# Patient Record
Sex: FEMALE | Race: OTHER | HISPANIC OR LATINO
[De-identification: names, ages, dates, MRNs, and addresses within clinical notes are randomized per-mention and may not be internally consistent; named-entity substitution may affect disease eponyms.]

---

## 2020-10-12 ENCOUNTER — APPOINTMENT (OUTPATIENT)
Dept: GASTROENTEROLOGY | Facility: CLINIC | Age: 55
End: 2020-10-12

## 2023-05-25 ENCOUNTER — INPATIENT (INPATIENT)
Facility: HOSPITAL | Age: 58
LOS: 11 days | Discharge: HOME | End: 2023-06-06
Attending: PSYCHIATRY & NEUROLOGY | Admitting: PSYCHIATRY & NEUROLOGY
Payer: MEDICAID

## 2023-05-25 VITALS
TEMPERATURE: 99 F | OXYGEN SATURATION: 100 % | SYSTOLIC BLOOD PRESSURE: 134 MMHG | DIASTOLIC BLOOD PRESSURE: 82 MMHG | RESPIRATION RATE: 16 BRPM | HEART RATE: 85 BPM

## 2023-05-25 DIAGNOSIS — F32.3 MAJOR DEPRESSIVE DISORDER, SINGLE EPISODE, SEVERE WITH PSYCHOTIC FEATURES: ICD-10-CM

## 2023-05-25 LAB
ALBUMIN SERPL ELPH-MCNC: 4.2 G/DL — SIGNIFICANT CHANGE UP (ref 3.3–5)
ALP SERPL-CCNC: 75 U/L — SIGNIFICANT CHANGE UP (ref 40–120)
ALT FLD-CCNC: 17 U/L — SIGNIFICANT CHANGE UP (ref 4–33)
ANION GAP SERPL CALC-SCNC: 10 MMOL/L — SIGNIFICANT CHANGE UP (ref 7–14)
APPEARANCE UR: CLEAR — SIGNIFICANT CHANGE UP
AST SERPL-CCNC: 16 U/L — SIGNIFICANT CHANGE UP (ref 4–32)
BASOPHILS # BLD AUTO: 0.07 K/UL — SIGNIFICANT CHANGE UP (ref 0–0.2)
BASOPHILS NFR BLD AUTO: 0.9 % — SIGNIFICANT CHANGE UP (ref 0–2)
BILIRUB SERPL-MCNC: 0.3 MG/DL — SIGNIFICANT CHANGE UP (ref 0.2–1.2)
BILIRUB UR-MCNC: NEGATIVE — SIGNIFICANT CHANGE UP
BUN SERPL-MCNC: 6 MG/DL — LOW (ref 7–23)
CALCIUM SERPL-MCNC: 8.9 MG/DL — SIGNIFICANT CHANGE UP (ref 8.4–10.5)
CHLORIDE SERPL-SCNC: 105 MMOL/L — SIGNIFICANT CHANGE UP (ref 98–107)
CO2 SERPL-SCNC: 26 MMOL/L — SIGNIFICANT CHANGE UP (ref 22–31)
COLOR SPEC: SIGNIFICANT CHANGE UP
CREAT SERPL-MCNC: 0.7 MG/DL — SIGNIFICANT CHANGE UP (ref 0.5–1.3)
DIFF PNL FLD: NEGATIVE — SIGNIFICANT CHANGE UP
EGFR: 100 ML/MIN/1.73M2 — SIGNIFICANT CHANGE UP
EOSINOPHIL # BLD AUTO: 0.05 K/UL — SIGNIFICANT CHANGE UP (ref 0–0.5)
EOSINOPHIL NFR BLD AUTO: 0.7 % — SIGNIFICANT CHANGE UP (ref 0–6)
GLUCOSE SERPL-MCNC: 121 MG/DL — HIGH (ref 70–99)
GLUCOSE UR QL: NEGATIVE — SIGNIFICANT CHANGE UP
HCT VFR BLD CALC: 38.8 % — SIGNIFICANT CHANGE UP (ref 34.5–45)
HGB BLD-MCNC: 12.4 G/DL — SIGNIFICANT CHANGE UP (ref 11.5–15.5)
IANC: 3.69 K/UL — SIGNIFICANT CHANGE UP (ref 1.8–7.4)
IMM GRANULOCYTES NFR BLD AUTO: 0.1 % — SIGNIFICANT CHANGE UP (ref 0–0.9)
KETONES UR-MCNC: NEGATIVE — SIGNIFICANT CHANGE UP
LEUKOCYTE ESTERASE UR-ACNC: ABNORMAL
LYMPHOCYTES # BLD AUTO: 2.92 K/UL — SIGNIFICANT CHANGE UP (ref 1–3.3)
LYMPHOCYTES # BLD AUTO: 38 % — SIGNIFICANT CHANGE UP (ref 13–44)
MCHC RBC-ENTMCNC: 27.7 PG — SIGNIFICANT CHANGE UP (ref 27–34)
MCHC RBC-ENTMCNC: 32 GM/DL — SIGNIFICANT CHANGE UP (ref 32–36)
MCV RBC AUTO: 86.8 FL — SIGNIFICANT CHANGE UP (ref 80–100)
MONOCYTES # BLD AUTO: 0.95 K/UL — HIGH (ref 0–0.9)
MONOCYTES NFR BLD AUTO: 12.4 % — SIGNIFICANT CHANGE UP (ref 2–14)
NEUTROPHILS # BLD AUTO: 3.69 K/UL — SIGNIFICANT CHANGE UP (ref 1.8–7.4)
NEUTROPHILS NFR BLD AUTO: 47.9 % — SIGNIFICANT CHANGE UP (ref 43–77)
NITRITE UR-MCNC: NEGATIVE — SIGNIFICANT CHANGE UP
NRBC # BLD: 0 /100 WBCS — SIGNIFICANT CHANGE UP (ref 0–0)
NRBC # FLD: 0 K/UL — SIGNIFICANT CHANGE UP (ref 0–0)
PCP SPEC-MCNC: SIGNIFICANT CHANGE UP
PH UR: 6.5 — SIGNIFICANT CHANGE UP (ref 5–8)
PLATELET # BLD AUTO: 313 K/UL — SIGNIFICANT CHANGE UP (ref 150–400)
POTASSIUM SERPL-MCNC: 4.4 MMOL/L — SIGNIFICANT CHANGE UP (ref 3.5–5.3)
POTASSIUM SERPL-SCNC: 4.4 MMOL/L — SIGNIFICANT CHANGE UP (ref 3.5–5.3)
PROT SERPL-MCNC: 7 G/DL — SIGNIFICANT CHANGE UP (ref 6–8.3)
PROT UR-MCNC: NEGATIVE — SIGNIFICANT CHANGE UP
RBC # BLD: 4.47 M/UL — SIGNIFICANT CHANGE UP (ref 3.8–5.2)
RBC # FLD: 12.5 % — SIGNIFICANT CHANGE UP (ref 10.3–14.5)
SODIUM SERPL-SCNC: 141 MMOL/L — SIGNIFICANT CHANGE UP (ref 135–145)
SP GR SPEC: 1.01 — SIGNIFICANT CHANGE UP (ref 1.01–1.05)
TOXICOLOGY SCREEN, DRUGS OF ABUSE, SERUM RESULT: SIGNIFICANT CHANGE UP
TSH SERPL-MCNC: 1.86 UIU/ML — SIGNIFICANT CHANGE UP (ref 0.27–4.2)
UROBILINOGEN FLD QL: SIGNIFICANT CHANGE UP
WBC # BLD: 7.69 K/UL — SIGNIFICANT CHANGE UP (ref 3.8–10.5)
WBC # FLD AUTO: 7.69 K/UL — SIGNIFICANT CHANGE UP (ref 3.8–10.5)

## 2023-05-25 PROCEDURE — 99285 EMERGENCY DEPT VISIT HI MDM: CPT

## 2023-05-25 RX ORDER — HYDROXYZINE HCL 10 MG
25 TABLET ORAL EVERY 4 HOURS
Refills: 0 | Status: DISCONTINUED | OUTPATIENT
Start: 2023-05-26 | End: 2023-06-06

## 2023-05-25 NOTE — ED BEHAVIORAL HEALTH ASSESSMENT NOTE - SUMMARY
57 y/o single,  female, has 2 adult sons, domicile , with no significant medical hx, no formal psychiatric hx, has remote hx of sa via OD  at age 14 , stayed in the hospital one day, no other treatment and currently not in treatment. Patient denies any hx of NSSIB. she denies any hx of substance use, aggression or violence . Pt bib son for depression and si. 57 y/o single,  female, has 2 adult sons, domicile , with no significant medical hx, no formal psychiatric hx, has remote hx of sa via OD  at age 14 , stayed in the hospital one day, no other treatment and currently not in treatment. Patient denies any hx of NSSIB. she denies any hx of substance use, aggression or violence . Pt bib son for depression and si.    Patient presents with symptoms concerning for depressive episode with passive  +si , although currently denies any active si/i/p. Patient had active si and s/p sa by OD and cutting her wrist . She is ambivalent about the sa , not future oriented , remains hopeless , and not able to engage in safety planning . Patient is expressing some desire to get better and agreeable to voluntary admission for stabilization and safety.   Patient doesn't need 1:1 denies any active si/i/p and is able to contract for safety in the hospital setting.

## 2023-05-25 NOTE — ED ADULT NURSE NOTE - OBJECTIVE STATEMENT
Pt received to ; presents calm and cooperative. Pt denies psychiatric hx; states she has been feeling depressed, anxious and suicidal because "things in life haven't been going her way and everything is crumbling". Pt states yesterday she cut her wrist (small, superficial abrasion noted to pts L wrist). Pt denies HI. Pts belongings secured for safety. Pt awaiting psychiatric evaluation.

## 2023-05-25 NOTE — ED PROVIDER NOTE - CLINICAL SUMMARY MEDICAL DECISION MAKING FREE TEXT BOX
This is a 58-year-old female with no significant past medical history presented to the ED complaining having suicidal attempt and ideations over the last several day. Suicidal Attempt/ideations-labs, ekg, psych consultation reassess

## 2023-05-25 NOTE — ED BEHAVIORAL HEALTH ASSESSMENT NOTE - NSPRESENTSXS_PSY_ALL_CORE
Addended by: STAR MOLINA on: 2/13/2017 01:56 PM     Modules accepted: Orders    
Depressed mood/Anhedonia/Impulsivity/Hopelessness or despair/Severe anxiety, agitation or panic

## 2023-05-25 NOTE — ED BEHAVIORAL HEALTH ASSESSMENT NOTE - RISK ASSESSMENT
pt is at high acute risk for self harm, given sa via od and cutting writs, remains suicidal , hopeless with active depressive sx. Pt has other risk factors: prior sa, not in treatment.  Protective factors: has insight , supportive family , no substance use , wants to get better.   Risks will be mitigated by inpatient hospitalization.

## 2023-05-25 NOTE — ED BEHAVIORAL HEALTH NOTE - BEHAVIORAL HEALTH NOTE
COVID Exposure Screen- Patient    Have you been tested for COVID-19 in the last 90 days?  (  ) Yes  ( x ) No   (  ) Unknown- Reason: _____  IF YES: Date of test(s), type of test(s), result(s) for ALL tests in last 90 days: ________    In the past 10 days, have you been around anyone with a positive COVID-19 test? (  ) Yes  (x  ) No   (  ) Unknown- Reason: ____  IF YES: Were you closer than 6 feet of them for a total of 15 minutes or more in a 24 hour period? (  ) Yes   (  ) No   ( ) Unknown- Reason: _____

## 2023-05-25 NOTE — ED BEHAVIORAL HEALTH ASSESSMENT NOTE - PSYCHIATRIC ISSUES AND PLAN (INCLUDE STANDING AND PRN MEDICATION)
defer standing medications to primary team . for agitation: Ativan 2mg po/im q6hrs prn . for anxiety: atarax : 25 mg q4hrs prn

## 2023-05-25 NOTE — ED BEHAVIORAL HEALTH ASSESSMENT NOTE - HPI (INCLUDE ILLNESS QUALITY, SEVERITY, DURATION, TIMING, CONTEXT, MODIFYING FACTORS, ASSOCIATED SIGNS AND SYMPTOMS)
59 y/o single,  female, has 2 adult sons, domicile , with no significant medical hx, no formal psychiatric hx, has remote hx of sa via OD  at age 14 , stayed in the hospital one day, no other treatment and currently not in treatment. Patient denies any hx of NSSIB. she denies any hx of substance use, aggression or violence . Pt bib son for depression and si.     On assessment pt is calm, cooperative . She reports in the past week she has been feeling "like my life is being played out in front of me ", "everything seems to be off ". Patient explains she feels like a negative force  is pulling her, and has been feeling overwhelmed by the feelings . Last night she felt anxious and is endorsing having a panic attack, "the walls were caving in ", and she felt sob , and in the moment felt she wanted to die , the feeling prompting her to drink 1/2 bottle of Nyquil in sa . When she saw that Nyquil  did not effect her she took a knife and tried to cut her left  wrists with intention to kill herself . (pt has small superficial cuts on the left wrist , no sutures required). Patient reports stopping herself from cutting deeper because she felt tired and sleepy and went to bed. This morning she felt "mixed emotions" and then states "like I am getting closer to the end of my own existence ". She denies having si prior to last night .  She told her friends today  about her sa and they informed her son who brought pt to the hospital. Pt is unable to identify any specific triggers for this episode .   Pt reports depressive sx x1 week , including decrease energy level, poor appetite  , lost 10 lbs in the past few weeks , poor sleep explaining that the sleep is restless and gets about 4-5 hrs/night, +anhedonia, loss of interest in doing things including reading books and watching tv, she is endorsing hopelessness and passive si , but denies any active si/i/p. Patient denies any hi/i/p. She admits to having suspiciousness and paranoia that she is being watched . She denies ah/vh . No manic sx including elevated mood, decrease need for sleep, grandiosity.

## 2023-05-25 NOTE — ED ADULT TRIAGE NOTE - CHIEF COMPLAINT QUOTE
Pt c/o SI x 3 days. pt states she has been having inc stressors at home and snapped. pt tried to cut her wrists yesterday. Pt noted to have superficial lacerations to left wrist. Pt states everything is moving so fast and doesn't make sense to her. Pt denies HI.  Pt denies visual or auditory hallucinations or other complaints. Pt states he has one psych admission with never had a diagnosis.

## 2023-05-25 NOTE — ED ADULT NURSE NOTE - NSFALLUNIVINTERV_ED_ALL_ED
Non-slip footwear applied when patient is off stretcher/Physically safe environment - no spills, clutter or unnecessary equipment/Purposeful proactive rounding

## 2023-05-25 NOTE — ED PROVIDER NOTE - PROGRESS NOTE DETAILS
MD CHO:  I received s/o on this pt from PETAR Milan.  Plan:  f/u covid and admit pt to [facility].  Will admit to 2w, Dr. Null.

## 2023-05-25 NOTE — ED BEHAVIORAL HEALTH NOTE - BEHAVIORAL HEALTH NOTE
As per request of provider, writer contacted patient’s son Glen ( [phone]) to obtain collateral information. The following information is per son.    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 Glen reports patient had friends over today due to concerns and patient shared that she harmed herself yesterday with a knife. son reports patient has presented bizarre since Monday. Patient was scheduled to go to [facility] on Monday but cancelled due ot knots in her stomach. Son says the patient mentioned her life insurance to the son this week which was odd. Patient reports feeling in a fog and is stressed. He reports the patient presents hopeless and has made statements today such as “ I don’t see my purpose here, I don’t want to be here anymore, nobody is helping me and everyone is against me”. Son says patient has no hx of si and no prior suicide attempts. He reports the patient is not paranoid or delusional. He says the patient denies any Avh. 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 He feels this week she has been more sheltered. Patient has no medical problems. Patient is covid vaccinated and has no recent travel or exposure. He denied any family hx of mental illness or addiction. Son reports patient does not have access to firearms or weapons. He reports patient’s father has a hx of dementia. He says patient does not have access to fire arms or weapons. Son unsure what patient currently needs at this time. As per request of provider, writer contacted patient’s son Glen ( [phone]) to obtain collateral information. The following information is per son.    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 Glen reports patient had friends over today due to concerns and patient shared that she harmed herself yesterday with a knife. son reports patient has presented bizarre since Monday. Patient was scheduled to go to [facility] on Monday but cancelled due ot knots in her stomach. Son says the patient mentioned her life insurance to the son this week which was odd. Patient reports feeling in a fog and is stressed. He reports the patient presents hopeless and has made statements today such as “ I don’t see my purpose here, I don’t want to be here anymore, nobody is helping me and everyone is against me”. Son says patient has no hx of si and no prior suicide attempts. He reports the patient is not paranoid or delusional. He says the patient denies any Avh. 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 He feels this week she has been more sheltered. Patient has no medical problems. Patient is covid vaccinated and has no recent travel or exposure. He denied any family hx of mental illness or addiction. Son reports patient does not have access to firearms or weapons. He reports patient’s father has a hx of dementia. He says patient does not have access to fire arms or weapons. Son unsure what patient currently needs at this time.    writer contacted patient’s son Glen ( [phone]) to inform him of patient's admission to [facility].

## 2023-05-25 NOTE — ED PROVIDER NOTE - OBJECTIVE STATEMENT
This is a 58-year-old female with no significant past medical history presented to the ED complaining having suicidal attempt and ideations over the last several days.  She states that a lot of things in her life are not going her way and have been feeling depressed and anxious.  She attempted to cut herself on her left wrist with a knife and she also attempted to drink Benadryl half a bottle to commit suicide.  Family and son were concerned about her this forcing her to come to the emergency room to get the help.  She states that she had a trip planned to [place] however had to cancel due to the stressors in her life.  She denies having any dysuria hematuria urinary urgency frequency drug or alcohol use.  She said she would hospitalized once before for psychiatric reason however is not ever been formally diagnosed with depression or anxiety or any psychiatric problems she denies having any homicidal ideations and drug or alcohol use or auditory or visual or auditory visual hallucinations

## 2023-05-25 NOTE — ED BEHAVIORAL HEALTH ASSESSMENT NOTE - NSSUICRSKFACTOR_PSY_ALL_CORE
Patient states that levothyroxine and synthroid does not work but she will try the [place] Thyroid 120 mg again. She has 15 pills left at home that she will restart this. Patient aware of the rest of the lab work was normal.   Sai Pierce is not going to be in the office until June 1 and that will not help her with her always being cold and her all over pain.
Current and Past Psychiatric Diagnoses/Presenting Symptoms/Historical Factors/Treatment Related Factors

## 2023-05-25 NOTE — ED BEHAVIORAL HEALTH ASSESSMENT NOTE - DESCRIPTION
none known single , has 2 adult sons, empolyed calm, cooperative  Vital Signs Last 24 Hrs  T(C): 37 (25 May 2023 20:55), Max: 37 (25 May 2023 20:55)  T(F): 98.6 (25 May 2023 20:55), Max: 98.6 (25 May 2023 20:55)  HR: 85 (25 May 2023 20:55) (85 - 85)  BP: 134/82 (25 May 2023 20:55) (134/82 - 134/82)  BP(mean): --  RR: 16 (25 May 2023 20:55) (16 - 16)  SpO2: 100% (25 May 2023 20:55) (100% - 100%)    Parameters below as of 25 May 2023 20:55  Patient On (Oxygen Delivery Method): room air single , has 2 adult sons, employed

## 2023-05-26 DIAGNOSIS — R45.851 SUICIDAL IDEATIONS: ICD-10-CM

## 2023-05-26 LAB
FLUAV AG NPH QL: SIGNIFICANT CHANGE UP
FLUBV AG NPH QL: SIGNIFICANT CHANGE UP
RSV RNA NPH QL NAA+NON-PROBE: SIGNIFICANT CHANGE UP
SARS-COV-2 RNA SPEC QL NAA+PROBE: SIGNIFICANT CHANGE UP

## 2023-05-26 PROCEDURE — 99222 1ST HOSP IP/OBS MODERATE 55: CPT

## 2023-05-26 RX ORDER — SERTRALINE 25 MG/1
25 TABLET, FILM COATED ORAL DAILY
Refills: 0 | Status: DISCONTINUED | OUTPATIENT
Start: 2023-05-27 | End: 2023-05-29

## 2023-05-26 RX ORDER — LANOLIN ALCOHOL/MO/W.PET/CERES
3 CREAM (GRAM) TOPICAL AT BEDTIME
Refills: 0 | Status: DISCONTINUED | OUTPATIENT
Start: 2023-05-26 | End: 2023-06-06

## 2023-05-26 RX ORDER — MAGNESIUM HYDROXIDE 400 MG/1
30 TABLET, CHEWABLE ORAL DAILY
Refills: 0 | Status: DISCONTINUED | OUTPATIENT
Start: 2023-05-26 | End: 2023-06-06

## 2023-05-26 RX ORDER — ACETAMINOPHEN 500 MG
650 TABLET ORAL EVERY 6 HOURS
Refills: 0 | Status: DISCONTINUED | OUTPATIENT
Start: 2023-05-26 | End: 2023-06-06

## 2023-05-26 RX ORDER — HALOPERIDOL DECANOATE 100 MG/ML
5 INJECTION INTRAMUSCULAR EVERY 6 HOURS
Refills: 0 | Status: DISCONTINUED | OUTPATIENT
Start: 2023-05-26 | End: 2023-06-06

## 2023-05-26 RX ORDER — HALOPERIDOL DECANOATE 100 MG/ML
5 INJECTION INTRAMUSCULAR ONCE
Refills: 0 | Status: DISCONTINUED | OUTPATIENT
Start: 2023-05-26 | End: 2023-06-06

## 2023-05-26 RX ORDER — OLANZAPINE 15 MG/1
2.5 TABLET, FILM COATED ORAL AT BEDTIME
Refills: 0 | Status: DISCONTINUED | OUTPATIENT
Start: 2023-05-26 | End: 2023-05-31

## 2023-05-26 RX ORDER — SERTRALINE 25 MG/1
25 TABLET, FILM COATED ORAL ONCE
Refills: 0 | Status: COMPLETED | OUTPATIENT
Start: 2023-05-26 | End: 2023-05-26

## 2023-05-26 RX ADMIN — OLANZAPINE 2.5 MILLIGRAM(S): 15 TABLET, FILM COATED ORAL at 22:01

## 2023-05-26 RX ADMIN — SERTRALINE 25 MILLIGRAM(S): 25 TABLET, FILM COATED ORAL at 18:13

## 2023-05-26 NOTE — BH SOCIAL WORK INITIAL PSYCHOSOCIAL EVALUATION - NSCMSPTSTRENGTHS_PSY_ALL_CORE
Expressive of emotions/Financial stability/Intact employment/Intact family/Interpersonal skills/Supportive family

## 2023-05-26 NOTE — BH INPATIENT PSYCHIATRY ASSESSMENT NOTE - CURRENT MEDICATION
MEDICATIONS  (STANDING):  OLANZapine 2.5 milliGRAM(s) Oral at bedtime  sertraline 25 milliGRAM(s) Oral once    MEDICATIONS  (PRN):  acetaminophen     Tablet .. 650 milliGRAM(s) Oral every 6 hours PRN Mild Pain (1 - 3), Moderate Pain (4 - 6), Severe Pain (7 - 10)  haloperidol     Tablet 5 milliGRAM(s) Oral every 6 hours PRN agitation  haloperidol    Injectable 5 milliGRAM(s) IntraMuscular once PRN severe agitation  LORazepam     Tablet 1 milliGRAM(s) Oral every 6 hours PRN anxiety  LORazepam   Injectable 2 milliGRAM(s) IntraMuscular once PRN severe anxiety  magnesium hydroxide Suspension 30 milliLiter(s) Oral daily PRN Constipation  melatonin. 3 milliGRAM(s) Oral at bedtime PRN Insomnia   MEDICATIONS  (STANDING):  OLANZapine 2.5 milliGRAM(s) Oral at bedtime    MEDICATIONS  (PRN):  acetaminophen     Tablet .. 650 milliGRAM(s) Oral every 6 hours PRN Mild Pain (1 - 3), Moderate Pain (4 - 6), Severe Pain (7 - 10)  haloperidol     Tablet 5 milliGRAM(s) Oral every 6 hours PRN agitation  haloperidol    Injectable 5 milliGRAM(s) IntraMuscular once PRN severe agitation  hydrOXYzine hydrochloride 25 milliGRAM(s) Oral every 4 hours PRN Anxiety  LORazepam     Tablet 1 milliGRAM(s) Oral every 6 hours PRN anxiety  LORazepam   Injectable 2 milliGRAM(s) IntraMuscular once PRN severe anxiety  magnesium hydroxide Suspension 30 milliLiter(s) Oral daily PRN Constipation  melatonin. 3 milliGRAM(s) Oral at bedtime PRN Insomnia

## 2023-05-26 NOTE — BH INPATIENT PSYCHIATRY ASSESSMENT NOTE - HPI (INCLUDE ILLNESS QUALITY, SEVERITY, DURATION, TIMING, CONTEXT, MODIFYING FACTORS, ASSOCIATED SIGNS AND SYMPTOMS)
59 y/o single,  female, has 2 adult sons, domicile , with no significant medical hx, no formal psychiatric hx, has remote hx of sa via OD  at age 14 , stayed in the hospital one day, no other treatment and currently not in treatment. Patient denies any hx of NSSIB. she denies any hx of substance use, aggression or violence . Pt bib son for depression and si.     On 2W: pt reports that she has been struggling with "anxiety, depression, and confusion" for the past two weeks. 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 Pt states her depression focuses around "poor concentration, memory, and motivation." Pt reports trouble with sleep because of this and often feels unrested when she wakes. Appetite has also been negatively impacted and she has been eating less. Other depressive symptoms include anhedonia and lack of interest. She reports that she feels the world has gone "topsy turvey and upside down" and that she has feelings of "people controlling my mind." She denies AVH. She is unable to attribute worsening depression and anxiety to anything in particular and states that she "snapped," leading her to take nyquil and cut self. L wrist assessed with noted superficial cuts, now scabbed. Pt denies pain at cite at this time. Pt denies other cuts on body with none noted. Pt reports that she does not have a history of SI/SA and that this is her first attempt, not elaborating on prior OD when asked. At this time, pt denies SI/HI.     Per ED: On assessment pt is calm, cooperative . She reports in the past week she has been feeling "like my life is being played out in front of me ", "everything seems to be off ". Patient explains she feels like a negative force  is pulling her, and has been feeling overwhelmed by the feelings . Last night she felt anxious and is endorsing having a panic attack, "the walls were caving in ", and she felt sob , and in the moment felt she wanted to die , the feeling prompting her to drink 1/2 bottle of Nyquil in sa . When she saw that Nyquil  did not effect her she took a knife and tried to cut her left  wrists with intention to kill herself . (pt has small superficial cuts on the left wrist , no sutures required). Patient reports stopping herself from cutting deeper because she felt tired and sleepy and went to bed. This morning she felt "mixed emotions" and then states "like I am getting closer to the end of my own existence ". She denies having si prior to last night .  She told her friends today  about her sa and they informed her son who brought pt to the hospital. Pt is unable to identify any specific triggers for this episode .   Pt reports depressive sx x1 week , including decrease energy level, poor appetite  , lost 10 lbs in the past few weeks , poor sleep explaining that the sleep is restless and gets about 4-5 hrs/night, +anhedonia, loss of interest in doing things including reading books and watching tv, she is endorsing hopelessness and passive si , but denies any active si/i/p. Patient denies any hi/i/p. She admits to having suspiciousness and paranoia that she is being watched . She denies ah/vh . No manic sx including elevated mood, decrease need for sleep, grandiosity.

## 2023-05-26 NOTE — BH INPATIENT PSYCHIATRY ASSESSMENT NOTE - NSCOMMENTSUICRISKFACT_PSY_ALL_CORE
pt currently denies SI/HI/AVH though is at an increase risk due to psychotic features, lack of psychiatric care, prior history of SA, history of trauma. Risk factors offset by support from her  and current inpatient level of care and willingness to take medication

## 2023-05-26 NOTE — BH INPATIENT PSYCHIATRY ASSESSMENT NOTE - NSBHASSESSSUMMFT_PSY_ALL_CORE
59 y/o single,  female, has 2 adult sons, domicile , with no significant medical hx, no formal psychiatric hx, has remote hx of sa via OD  at age 14 , stayed in the hospital one day, no other treatment and currently not in treatment. Patient denies any hx of NSSIB. she denies any hx of substance use, aggression or violence . Pt bib son for depression and si.     on assessment, pt presented guarded, suspicious at times, superficially cooperative with interview. Pt offered minimal information as to presenting problems, showing poor insight into need for hospitalization with request for discharge. Treatment plan reviewed with pt, discussing medications along with common side effects, with pt stating "I'll think about it." Sertraline started to help target depressive symptoms and anxiety. Olanzapine started to target psychosis and help improve sleep. No behavioral issues noted. Continue to monitor for safety.     Treatment Plan  1. admitted to unit, legal status 9.13  2. safety  - routine checks as pt denies SIIP/HIIP and is able to contract for safety  - haldol and ativan PO/IM PRN for agitation and anxiety  3. psychiatric  - started sertraline 25mg for MDD  - started olanzapine 2.5mg for MDD with psychotic features  4. medical  - pt denies  5. encourage I/G/M therapy

## 2023-05-26 NOTE — BH SOCIAL WORK INITIAL PSYCHOSOCIAL EVALUATION - OTHER PAST PSYCHIATRIC HISTORY (INCLUDE DETAILS REGARDING ONSET, COURSE OF ILLNESS, INPATIENT/OUTPATIENT TREATMENT)
Pt is a 57 y/o single,  female, has 2 adult sons, domiciled , with no significant medical hx, no formal psychiatric hx, has remote hx of sa via OD  at age 14 , stayed in the hospital one day, no other treatment and currently not in treatment. Patient denies any hx of NSSIB. she denies any hx of substance use, aggression or violence . Pt bib son for depression and si.   In ED, pt reported that in the past week she has been feeling "like my life is being played out in front of me ", "everything seems to be off ". Patient explains she feels like a negative force  is pulling her, and has been feeling overwhelmed by the feelings . Last night she felt anxious and is endorsing having a panic attack, "the walls were caving in ", and she felt SOB, and in the moment felt she wanted to die. This feeling prompted her to drink 1/2 bottle of Nyquil in a SA . When she saw that Nyquil  did not effect her she took a knife and tried to cut her left  wrists with intention to kill herself . (pt has small superficial cuts on the left wrist , no sutures required). Patient reports stopping herself from cutting deeper because she felt tired and sleepy and went to bed. This morning she felt "mixed emotions" and then states "like I am getting closer to the end of my own existence ". She denies having SI prior to that episode.  Pt told her friends about her SA when they were at her house, and they informed her son who brought pt to the hospital. Pt is unable to identify any specific triggers for this episode .   Pt reports depressive sx x1 week , including decrease energy level, poor appetite  , lost 10 lbs in the past few weeks , poor sleep explaining that the sleep is restless and gets about 4-5 hrs/night, +anhedonia, loss of interest in doing things including reading books and watching tv, she is endorsing hopelessness and passive si , but denies any active si/i/p. Patient denies any hi/i/p. She admits to having suspiciousness and paranoia that she is being watched . She denies ah/vh . No manic sx including elevated mood, decrease need for sleep, grandiosity.    The following information is per son, Glen:  Glen reports patient had friends over and patient shared that she harmed herself yesterday with a knife. Son reports patient has presented bizarre since Monday. Patient was scheduled to go to [facility] on Monday but cancelled due to knots in her stomach. Son says the patient mentioned her life insurance to the son this week which was odd. Patient reports feeling in a fog and is stressed. He reports the patient presents hopeless and has made statements today such as “ I don’t see my purpose here, I don’t want to be here anymore, nobody is helping me and everyone is against me”. Son says patient has no hx of si and no prior suicide attempts. He reports the patient is not paranoid or delusional. He says the patient denies any Avh. 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 He feels this week she has been more sheltered.

## 2023-05-26 NOTE — BH INPATIENT PSYCHIATRY ASSESSMENT NOTE - NSTXSUICIDINTERMD_PSY_ALL_CORE
medication management and education  started on sertraline and olanzapine for MDD with psychotic features

## 2023-05-26 NOTE — BH PATIENT PROFILE - HISTORY OF COVID-19 VACCINATION
0700: Pt is addiment that he is leaving this morning. Has not pulled out his core track but has disconnected it and refuses to have it reconnected at this time. believes he is leaving, will give him an hour to calm down, will attempt redirection,if this doesn't work will attempt PRN meds and or have MD speak with patient.     0815: Pt was redirectable was was addiment that he is leaving. Is obsessed with his cloths in the closet and asking to get dressed.  Was oriented x2, off on date and situation but told me he was in the hospital. Agitation may escalate throughout the day if he is still confused about his plan of care.      1230: Pt has become more agitated, obsessed about his cloths,money and leaving the hospital. Has started hitting staff and is not redirectable. PT was placed in soft wrist restraints at this time. MD made aware of situation.     1300: Pt was had talked staff into giving him his belongings however on admission he had bed bugs, CODE CLEAR was preformed at this time.     1500: Resting comfortable, seems to be withdrawn with restraints on. TAP system in place.     1800: uneventful, calm.   Vaccine status unknown

## 2023-05-26 NOTE — BH INPATIENT PSYCHIATRY ASSESSMENT NOTE - NSBHCHARTREVIEWVS_PSY_A_CORE FT
Vital Signs Last 24 Hrs  T(C): 36.7 (05-26-23 @ 08:42), Max: 37 (05-25-23 @ 20:55)  T(F): 98 (05-26-23 @ 08:42), Max: 98.6 (05-25-23 @ 20:55)  HR: 75 (05-26-23 @ 00:17) (75 - 85)  BP: 146/80 (05-26-23 @ 00:17) (134/82 - 146/80)  BP(mean): --  RR: 16 (05-26-23 @ 00:17) (16 - 16)  SpO2: 100% (05-26-23 @ 00:17) (100% - 100%)    Orthostatic VS  05-26-23 @ 08:42  Lying BP: --/-- HR: --  Sitting BP: 145/76 HR: 74  Standing BP: 128/79 HR: 88  Site: --  Mode: --  Orthostatic VS  05-26-23 @ 01:58  Lying BP: --/-- HR: --  Sitting BP: 151/79 HR: 84  Standing BP: 149/84 HR: 86  Site: --  Mode: --   Vital Signs Last 24 Hrs  T(C): 36.7 (05-26-23 @ 08:42), Max: 36.9 (05-26-23 @ 00:17)  T(F): 98 (05-26-23 @ 08:42), Max: 98.5 (05-26-23 @ 00:17)  HR: 75 (05-26-23 @ 00:17) (75 - 75)  BP: 146/80 (05-26-23 @ 00:17) (146/80 - 146/80)  BP(mean): --  RR: 16 (05-26-23 @ 00:17) (16 - 16)  SpO2: 100% (05-26-23 @ 00:17) (100% - 100%)    Orthostatic VS  05-26-23 @ 08:42  Lying BP: --/-- HR: --  Sitting BP: 145/76 HR: 74  Standing BP: 128/79 HR: 88  Site: --  Mode: --  Orthostatic VS  05-26-23 @ 01:58  Lying BP: --/-- HR: --  Sitting BP: 151/79 HR: 84  Standing BP: 149/84 HR: 86  Site: --  Mode: --

## 2023-05-26 NOTE — BH INPATIENT PSYCHIATRY ASSESSMENT NOTE - NSBHATTESTAPPBILLTIME_PSY_A_CORE
I attest my time as LE is greater than 50% of the total combined time spent on qualifying patient care activities. I have reviewed and verified the documentation.

## 2023-05-26 NOTE — BH INPATIENT PSYCHIATRY ASSESSMENT NOTE - NSDCCRITERIA_PSY_ALL_CORE
CGI <2 return to baseline functioning as evidenced by behavioral control, staff observation, and pt self report

## 2023-05-26 NOTE — BH SOCIAL WORK INITIAL PSYCHOSOCIAL EVALUATION - NSBHABUSESEXHX_PSY_ALL_CORE
[Mucoid Discharge] : mucoid discharge [Supple] : supple [FROM] : full passive range of motion [NL] : warm [FreeTextEntry5] : normal conjunctiva & sclera, normal eyelids, no discharge noted No

## 2023-05-27 PROCEDURE — 99231 SBSQ HOSP IP/OBS SF/LOW 25: CPT

## 2023-05-27 RX ADMIN — SERTRALINE 25 MILLIGRAM(S): 25 TABLET, FILM COATED ORAL at 08:45

## 2023-05-27 RX ADMIN — OLANZAPINE 2.5 MILLIGRAM(S): 15 TABLET, FILM COATED ORAL at 21:23

## 2023-05-27 NOTE — BH INPATIENT PSYCHIATRY PROGRESS NOTE - CURRENT MEDICATION
MEDICATIONS  (STANDING):  OLANZapine 2.5 milliGRAM(s) Oral at bedtime  sertraline 25 milliGRAM(s) Oral daily    MEDICATIONS  (PRN):  acetaminophen     Tablet .. 650 milliGRAM(s) Oral every 6 hours PRN Mild Pain (1 - 3), Moderate Pain (4 - 6), Severe Pain (7 - 10)  haloperidol     Tablet 5 milliGRAM(s) Oral every 6 hours PRN agitation  haloperidol    Injectable 5 milliGRAM(s) IntraMuscular once PRN severe agitation  hydrOXYzine hydrochloride 25 milliGRAM(s) Oral every 4 hours PRN Anxiety  LORazepam     Tablet 1 milliGRAM(s) Oral every 6 hours PRN anxiety  LORazepam   Injectable 2 milliGRAM(s) IntraMuscular once PRN severe anxiety  magnesium hydroxide Suspension 30 milliLiter(s) Oral daily PRN Constipation  melatonin. 3 milliGRAM(s) Oral at bedtime PRN Insomnia

## 2023-05-27 NOTE — BH INPATIENT PSYCHIATRY PROGRESS NOTE - NSBHFUPINTERVALHXFT_PSY_A_CORE
f/up MDD, VSS. Patient reported feeling "good", rated depression 2/10, with 10 being severe. Appeared to be superficial.  Patient reported taking one day at a time, reported sleeping well and with good appetite. REported guilt feelings. Patient denied SE to meds. participating in groups. denied SE to meds.

## 2023-05-27 NOTE — PSYCHIATRIC REHAB INITIAL EVALUATION - NSBHPRRECOMMEND_PSY_ALL_CORE
The writer met with the patient to orient her to psychiatric rehabilitation staff and services. As per the patient’s chart, she was BIB her son for depression and SI. As per the patient, she came to the hospital because she was feeling depressed for a week and brought herself into the hospital, however, stated that she now wants to leave because she “doesn’t belong here”. The writer established a psychiatric rehabilitation goal for the patient to work on over the next seven days. The patient’s psychiatric rehabilitation goal is to identify and utilize 2 coping skills for her suicide/ SIB goal. Over the next seven days, Psychiatric Rehabilitation staff will utilize individual psychotherapy and psychoeducation to assist the patient in reaching her treatment goal. The patient denied SI/HI/AH/VH.

## 2023-05-27 NOTE — BH INPATIENT PSYCHIATRY PROGRESS NOTE - NSBHASSESSSUMMFT_PSY_ALL_CORE
57 y/o single,  female, has 2 adult sons, domicile , with no significant medical hx, no formal psychiatric hx, has remote hx of sa via OD  at age 14 , stayed in the hospital one day, no other treatment and currently not in treatment. Patient denies any hx of NSSIB. she denies any hx of substance use, aggression or violence . Pt bib son for depression and si.     on assessment, patient remains depressed, tolerating medications, denied SI/I/P    Treatment Plan  1. admitted to unit, legal status 9.13  2. safety  - routine checks as pt denies SIIP/HIIP and is able to contract for safety  - haldol and ativan PO/IM PRN for agitation and anxiety  3. psychiatric  - started sertraline 25mg for MDD  - started olanzapine 2.5mg for MDD with psychotic features  4. medical  - pt denies  5. encourage I/G/M therapy

## 2023-05-27 NOTE — BH INPATIENT PSYCHIATRY PROGRESS NOTE - NSBHCHARTREVIEWVS_PSY_A_CORE FT
Vital Signs Last 24 Hrs  T(C): 36.8 (05-27-23 @ 07:41), Max: 36.8 (05-27-23 @ 07:41)  T(F): 98.3 (05-27-23 @ 07:41), Max: 98.3 (05-27-23 @ 07:41)  HR: --  BP: --  BP(mean): --  RR: 18 (05-27-23 @ 07:41) (18 - 18)  SpO2: 99% (05-27-23 @ 07:41) (99% - 99%)    Orthostatic VS  05-27-23 @ 07:41  Lying BP: --/-- HR: --  Sitting BP: 118/70 HR: 73  Standing BP: 113/67 HR: 88  Site: --  Mode: --  Orthostatic VS  05-26-23 @ 08:42  Lying BP: --/-- HR: --  Sitting BP: 145/76 HR: 74  Standing BP: 128/79 HR: 88  Site: --  Mode: --  Orthostatic VS  05-26-23 @ 01:58  Lying BP: --/-- HR: --  Sitting BP: 151/79 HR: 84  Standing BP: 149/84 HR: 86  Site: --  Mode: --

## 2023-05-27 NOTE — PSYCHIATRIC REHAB INITIAL EVALUATION - PRIMARY SOURCE OF SUPPORT/COMFORT
Refill Request metFORMIN HCl 1000 MG Oral Tablet    Last Seen: 3/9/2020    Last Written: 12/9/20 60 tablets with 0 refills    Next Appointment: left message for patient to call and schedule appointment  No future appointments.         Requested Prescriptions     Pending Prescriptions Disp Refills    metFORMIN (GLUCOPHAGE) 1000 MG tablet [Pharmacy Med Name: metFORMIN HCl 1000 MG Oral Tablet] 60 tablet 0     Sig: TAKE 1 TABLET BY MOUTH TWICE DAILY WITH MEALS
The pt reported her source of support is God.

## 2023-05-28 PROCEDURE — 99231 SBSQ HOSP IP/OBS SF/LOW 25: CPT

## 2023-05-28 RX ADMIN — SERTRALINE 25 MILLIGRAM(S): 25 TABLET, FILM COATED ORAL at 08:17

## 2023-05-28 RX ADMIN — OLANZAPINE 2.5 MILLIGRAM(S): 15 TABLET, FILM COATED ORAL at 20:24

## 2023-05-28 NOTE — BH INPATIENT PSYCHIATRY PROGRESS NOTE - NSBHCHARTREVIEWVS_PSY_A_CORE FT
Vital Signs Last 24 Hrs  T(C): 36.7 (05-28-23 @ 07:42), Max: 36.7 (05-28-23 @ 07:42)  T(F): 98.1 (05-28-23 @ 07:42), Max: 98.1 (05-28-23 @ 07:42)  HR: --  BP: --  BP(mean): --  RR: 19 (05-28-23 @ 07:42) (19 - 19)  SpO2: --    Orthostatic VS  05-28-23 @ 07:42  Lying BP: --/-- HR: --  Sitting BP: 112/56 HR: 65  Standing BP: 106/67 HR: 73  Site: --  Mode: --  Orthostatic VS  05-27-23 @ 07:41  Lying BP: --/-- HR: --  Sitting BP: 118/70 HR: 73  Standing BP: 113/67 HR: 88  Site: --  Mode: --

## 2023-05-28 NOTE — BH INPATIENT PSYCHIATRY PROGRESS NOTE - NSBHASSESSSUMMFT_PSY_ALL_CORE
57 y/o single,  female, has 2 adult sons, domicile , with no significant medical hx, no formal psychiatric hx, has remote hx of sa via OD  at age 14 , stayed in the hospital one day, no other treatment and currently not in treatment. Patient denies any hx of NSSIB. she denies any hx of substance use, aggression or violence . Pt bib son for depression and si.     on assessment, patient remains depressed, with underlying irritability,  tolerating medications, denied SI/I/P    Treatment Plan  1. admitted to unit, legal status 9.13  2. safety  - routine checks as pt denies SIIP/HIIP and is able to contract for safety  - haldol and ativan PO/IM PRN for agitation and anxiety  3. psychiatric  - started sertraline 25mg for MDD  - started olanzapine 2.5mg for MDD with psychotic features  4. medical  - pt denies  5. encourage I/G/M therapy

## 2023-05-28 NOTE — BH INPATIENT PSYCHIATRY PROGRESS NOTE - NSBHFUPINTERVALHXFT_PSY_A_CORE
f/up MDD, VSS. Patient observed to be in bed, reported feeling "good", is superficial, appeared depressed, with underlying irritability when asked about not attending groups.  Patient reported  sleeping well and with good appetite.  Patient denied SE to meds. Keeping to self on the unit.

## 2023-05-29 PROCEDURE — 99231 SBSQ HOSP IP/OBS SF/LOW 25: CPT

## 2023-05-29 RX ORDER — SERTRALINE 25 MG/1
50 TABLET, FILM COATED ORAL DAILY
Refills: 0 | Status: DISCONTINUED | OUTPATIENT
Start: 2023-05-30 | End: 2023-05-31

## 2023-05-29 RX ADMIN — OLANZAPINE 2.5 MILLIGRAM(S): 15 TABLET, FILM COATED ORAL at 21:12

## 2023-05-29 RX ADMIN — SERTRALINE 25 MILLIGRAM(S): 25 TABLET, FILM COATED ORAL at 08:38

## 2023-05-29 NOTE — BH INPATIENT PSYCHIATRY PROGRESS NOTE - NSBHCHARTREVIEWVS_PSY_A_CORE FT
Vital Signs Last 24 Hrs  T(C): 36.4 (05-29-23 @ 07:42), Max: 36.4 (05-29-23 @ 07:42)  T(F): 97.6 (05-29-23 @ 07:42), Max: 97.6 (05-29-23 @ 07:42)  HR: --  BP: --  BP(mean): --  RR: 19 (05-29-23 @ 07:42) (19 - 19)  SpO2: 99% (05-29-23 @ 07:42) (99% - 99%)    Orthostatic VS  05-29-23 @ 07:42  Lying BP: --/-- HR: --  Sitting BP: 115/66 HR: 64  Standing BP: 108/71 HR: 73  Site: --  Mode: --  Orthostatic VS  05-28-23 @ 07:42  Lying BP: --/-- HR: --  Sitting BP: 112/56 HR: 65  Standing BP: 106/67 HR: 73  Site: --  Mode: --

## 2023-05-29 NOTE — BH INPATIENT PSYCHIATRY PROGRESS NOTE - NSBHASSESSSUMMFT_PSY_ALL_CORE
57 y/o single,  female, has 2 adult sons, domicile , with no significant medical hx, no formal psychiatric hx, has remote hx of sa via OD  at age 14 , stayed in the hospital one day, no other treatment and currently not in treatment. Patient denies any hx of NSSIB. she denies any hx of substance use, aggression or violence . Pt bib son for depression and si.     on assessment, patient remains depressed, with underlying irritability due to paranoid ideations,   tolerating medications, denied SI/I/P    Treatment Plan  1. admitted to unit, legal status 9.13  2. safety  - routine checks as pt denies SIIP/HIIP and is able to contract for safety  - haldol and ativan PO/IM PRN for agitation and anxiety  3. psychiatric  - increase  sertraline 50mg for MDD  - started olanzapine 2.5mg for MDD with psychotic features  4. medical  - pt denies  5. encourage I/G/M therapy

## 2023-05-29 NOTE — BH INPATIENT PSYCHIATRY PROGRESS NOTE - NSBHFUPINTERVALHXFT_PSY_A_CORE
f/up MDD, VSS. Patient observed to be in bed, reported mood is "much better", however observed with underlying irritability and suspicion.  Patient reported  sleeping at intervals and with good appetite.  Patient denied SE to meds, declined increase in zyprexa.

## 2023-05-30 PROCEDURE — 99231 SBSQ HOSP IP/OBS SF/LOW 25: CPT

## 2023-05-30 RX ADMIN — OLANZAPINE 2.5 MILLIGRAM(S): 15 TABLET, FILM COATED ORAL at 21:38

## 2023-05-30 RX ADMIN — SERTRALINE 50 MILLIGRAM(S): 25 TABLET, FILM COATED ORAL at 10:10

## 2023-05-30 NOTE — BH INPATIENT PSYCHIATRY PROGRESS NOTE - NSBHASSESSSUMMFT_PSY_ALL_CORE
57 y/o single,  female, has 2 adult sons, domicile , with no significant medical hx, no formal psychiatric hx, has remote hx of sa via OD  at age 14 , stayed in the hospital one day, no other treatment and currently not in treatment. Patient denies any hx of NSSIB. she denies any hx of substance use, aggression or violence . Pt bib son for depression and si.     on assessment, patient remains depressed, with underlying irritability due to paranoid ideations,   tolerating medications, denied SI/I/P    Treatment Plan  1. admitted to unit, legal status 9.13  2. safety  - routine checks as pt denies SIIP/HIIP and is able to contract for safety  - haldol and ativan PO/IM PRN for agitation and anxiety  3. psychiatric  - increase  sertraline 50mg for MDD  - started olanzapine 2.5mg for MDD with psychotic features  4. medical  - pt denies  5. encourage I/G/M therapy 57 y/o single,  female, has 2 adult sons, domicile , with no significant medical hx, no formal psychiatric hx, has remote hx of sa via OD  at age 14 , stayed in the hospital one day, no other treatment and currently not in treatment. Patient denies any hx of NSSIB. she denies any hx of substance use, aggression or violence . Pt bib son for depression and si.     on assessment, patient remains depressed, with underlying paranoid ideations,   tolerating medications, denied SI/I/P    Treatment Plan  1. admitted to unit, legal status 9.13  2. safety  - routine checks as pt denies SIIP/HIIP and is able to contract for safety  - haldol and ativan PO/IM PRN for agitation and anxiety  3. psychiatric  - continue sertraline 50mg daily for MDD  - continue olanzapine 2.5mg QHS for MDD with psychotic features  4. medical  - pt denies  5. encourage I/G/M therapy  6. SW to pursue discharge planning.

## 2023-05-30 NOTE — BH INPATIENT PSYCHIATRY PROGRESS NOTE - NSBHFUPINTERVALHXFT_PSY_A_CORE
f/up MDD, VSS. Patient observed to be in bed, reported mood is "much better", however observed with underlying irritability and suspicion.  Patient reported  sleeping at intervals and with good appetite.  Patient denied SE to meds, declined increase in zyprexa.  Patient seen for follow up for depression, chart reviewed, and case discussed with treatment team.  No events reported overnight. VSS. Patient observed to be visible on unit, reported mood is "better", however observed with underlying paranoia and suspiciousness.  Patient reported  sleeping at intervals and with good appetite.  Patient denied SE to meds, declined increase in zyprexa at this time, no EPS.  No agitation, no somatic complaints.

## 2023-05-30 NOTE — BH INPATIENT PSYCHIATRY PROGRESS NOTE - NSBHCHARTREVIEWVS_PSY_A_CORE FT
Vital Signs Last 24 Hrs  T(C): 36.5 (05-30-23 @ 07:52), Max: 36.5 (05-30-23 @ 07:52)  T(F): 97.7 (05-30-23 @ 07:52), Max: 97.7 (05-30-23 @ 07:52)  HR: --  BP: --  BP(mean): --  RR: 19 (05-30-23 @ 07:52) (19 - 19)  SpO2: --    Orthostatic VS  05-30-23 @ 07:52  Lying BP: --/-- HR: --  Sitting BP: 107/68 HR: 69  Standing BP: 102/62 HR: 74  Site: --  Mode: --  Orthostatic VS  05-29-23 @ 07:42  Lying BP: --/-- HR: --  Sitting BP: 115/66 HR: 64  Standing BP: 108/71 HR: 73  Site: --  Mode: --   Vital Signs Last 24 Hrs  T(C): 36.7 (06-02-23 @ 08:55), Max: 36.7 (06-02-23 @ 08:55)  T(F): 98 (06-02-23 @ 08:55), Max: 98 (06-02-23 @ 08:55)  HR: --  BP: --  BP(mean): --  RR: 20 (06-02-23 @ 08:55) (20 - 20)  SpO2: --    Orthostatic VS  06-02-23 @ 08:55  Lying BP: --/-- HR: --  Sitting BP: 133/72 HR: 67  Standing BP: 115/68 HR: 79  Site: --  Mode: --  Orthostatic VS  06-01-23 @ 07:51  Lying BP: --/-- HR: --  Sitting BP: 123/65 HR: 65  Standing BP: 116/64 HR: 69  Site: --  Mode: --

## 2023-05-30 NOTE — BH INPATIENT PSYCHIATRY PROGRESS NOTE - CURRENT MEDICATION
MEDICATIONS  (STANDING):  OLANZapine 2.5 milliGRAM(s) Oral at bedtime  sertraline 50 milliGRAM(s) Oral daily    MEDICATIONS  (PRN):  acetaminophen     Tablet .. 650 milliGRAM(s) Oral every 6 hours PRN Mild Pain (1 - 3), Moderate Pain (4 - 6), Severe Pain (7 - 10)  haloperidol     Tablet 5 milliGRAM(s) Oral every 6 hours PRN agitation  haloperidol    Injectable 5 milliGRAM(s) IntraMuscular once PRN severe agitation  hydrOXYzine hydrochloride 25 milliGRAM(s) Oral every 4 hours PRN Anxiety  LORazepam     Tablet 1 milliGRAM(s) Oral every 6 hours PRN anxiety  LORazepam   Injectable 2 milliGRAM(s) IntraMuscular once PRN severe anxiety  magnesium hydroxide Suspension 30 milliLiter(s) Oral daily PRN Constipation  melatonin. 3 milliGRAM(s) Oral at bedtime PRN Insomnia   MEDICATIONS  (STANDING):  OLANZapine 5 milliGRAM(s) Oral at bedtime  sertraline 100 milliGRAM(s) Oral daily    MEDICATIONS  (PRN):  acetaminophen     Tablet .. 650 milliGRAM(s) Oral every 6 hours PRN Mild Pain (1 - 3), Moderate Pain (4 - 6), Severe Pain (7 - 10)  haloperidol     Tablet 5 milliGRAM(s) Oral every 6 hours PRN agitation  haloperidol    Injectable 5 milliGRAM(s) IntraMuscular once PRN severe agitation  hydrOXYzine hydrochloride 25 milliGRAM(s) Oral every 4 hours PRN Anxiety  LORazepam     Tablet 1 milliGRAM(s) Oral every 6 hours PRN anxiety  LORazepam   Injectable 2 milliGRAM(s) IntraMuscular once PRN severe anxiety  magnesium hydroxide Suspension 30 milliLiter(s) Oral daily PRN Constipation  melatonin. 3 milliGRAM(s) Oral at bedtime PRN Insomnia

## 2023-05-30 NOTE — BH INPATIENT PSYCHIATRY PROGRESS NOTE - NSBHMETABOLIC_PSY_ALL_CORE_FT
BMI:   HbA1c:   Glucose:   BP: --  Lipid Panel:  BMI:   HbA1c: A1C with Estimated Average Glucose Result: 5.3 % (06-02-23 @ 08:57)    Glucose:   BP: --  Lipid Panel: Date/Time: 06-02-23 @ 08:57  Cholesterol, Serum: 201  Direct LDL: --  HDL Cholesterol, Serum: 62  Total Cholesterol/HDL Ration Measurement: --  Triglycerides, Serum: 137

## 2023-05-31 PROCEDURE — 99231 SBSQ HOSP IP/OBS SF/LOW 25: CPT

## 2023-05-31 RX ORDER — SERTRALINE 25 MG/1
100 TABLET, FILM COATED ORAL DAILY
Refills: 0 | Status: DISCONTINUED | OUTPATIENT
Start: 2023-06-01 | End: 2023-06-06

## 2023-05-31 RX ORDER — OLANZAPINE 15 MG/1
5 TABLET, FILM COATED ORAL AT BEDTIME
Refills: 0 | Status: DISCONTINUED | OUTPATIENT
Start: 2023-05-31 | End: 2023-06-06

## 2023-05-31 RX ADMIN — SERTRALINE 50 MILLIGRAM(S): 25 TABLET, FILM COATED ORAL at 11:01

## 2023-05-31 RX ADMIN — OLANZAPINE 5 MILLIGRAM(S): 15 TABLET, FILM COATED ORAL at 20:45

## 2023-05-31 NOTE — BH TREATMENT PLAN - NSTXSUICIDINTERPR_PSY_ALL_CORE
Pt made proress towards [facility] Rehab goal. Pt identified WISEMIND and  deep breathing as her coping skills. Rehabilitation staff will continue to provide encouragement, support, psychotherapy, and psychoeducation to assist the Pt in the progression of her treatment goal and engagement with activities.

## 2023-05-31 NOTE — BH TREATMENT PLAN - NSCMSPTSTRENGTHS_PSY_ALL_CORE
Expressive of emotions/Financial stability/Intact employment/Intact family/Interpersonal skills/Supportive family
Expressive of emotions/Financial stability/Intact employment/Intact family/Interpersonal skills/Supportive family

## 2023-05-31 NOTE — BH TREATMENT PLAN - NSTXDCOTHRGOAL_PSY_ALL_CORE
Pt became psychotic after experiencing a significant depressive episode
Pt has had a SA i/s/o unclear decompensation

## 2023-05-31 NOTE — BH INPATIENT PSYCHIATRY PROGRESS NOTE - NSBHFUPINTERVALHXFT_PSY_A_CORE
f/up MDD, VSS. Patient observed to be in bed, reported mood is "much better", however observed with underlying irritability and suspicion.  Patient reported  sleeping at intervals and with good appetite.  Patient denied SE to meds, declined increase in zyprexa.  Patient seen for follow up for depression with psychosis, chart reviewed, and case discussed with treatment team.  No events reported overnight.  VSS. Patient observed to be in bed, reported mood is "ok", however observed with underlying irritability and paranoia.  Patient reported  sleeping at intervals and with good appetite.  Patient denied SE to meds, agreed to increase in zyprexa and sertraline, no EPS.  No agitation, no somatic complaints.

## 2023-05-31 NOTE — BH TREATMENT PLAN - NSTXSUICIDGOAL_PSY_ALL_CORE
Be able to report that they independently used a coping skill instead of hurting oneself
Will identify and utilize 2 coping skills

## 2023-05-31 NOTE — BH TREATMENT PLAN - NSTXPLANTHERAPYSESSIONSFT_PSY_ALL_CORE
06-03-23  Type of therapy: Dialectical behavior therapy, Coping skills, Creative arts therapy, Leisure development, Peer advocate, Pet therapy, Psychoeducation, Spirituality  Type of session: Individual  Level of patient participation: Attentive, Engaged  Duration of participation: 15 minutes  Therapy conducted by: Psych rehab  Therapy Summary: Writer met with Pt to assess her progress towards Psych Rehab goal. Pt was receptive. Pt was calm, cooperative, and fully engaged during the session.  Pt reports improvement in her mood. Pt denies SI/HI/AH/VH. Over the past week, Pt made progress towards Psych Rehab goal of identifying coping skills. PT identified WISEMIND and deep breathing as her coping skills. Pt has been in behavioral control, good ADLs, and engages with her peers. She attended 80% of the groups in the past week--- leisure, spirituality, psychoeducation, DBT, and peer advocate. Pt may be discharged early next week and has shown slight improvement in her treatment. Pt shared her plan of resuming work and prioritize her mental health upon discharge. Writer provided praise and encouraged the Pt to utilize the learned coping skills during moments of distress. Pt complaint with meds and tolerating them well. Pt reports good sleep and appetite. Rehabilitation staff will continue to provide encouragement, support, psychotherapy, and psychoeducation to assist the Pt in the progression of her treatment goal and engagement with activities.

## 2023-05-31 NOTE — BH TREATMENT PLAN - NSBHPRIMARYDX_PSY_ALL_CORE
MDD (major depressive disorder), single episode, severe with psychotic features    
MDD (major depressive disorder), single episode, severe with psychotic features

## 2023-05-31 NOTE — BH TREATMENT PLAN - NSTXDCOTHRINTERSW_PSY_ALL_CORE
SW will speak wiith pt and family about the importance of tx compliance post d/c to increase likelihood of psychiatric stability in the community
Sw will encourage pt to take medications and focus on taking care of herself

## 2023-05-31 NOTE — BH TREATMENT PLAN - NSDCCRITERIA_PSY_ALL_CORE
CGI <2 return to baseline functioning as evidenced by behavioral control, staff observation, and pt self report
CGI <2 return to baseline functioning as evidenced by behavioral control, staff observation, and pt self report

## 2023-05-31 NOTE — BH INPATIENT PSYCHIATRY PROGRESS NOTE - NSBHCHARTREVIEWVS_PSY_A_CORE FT
Vital Signs Last 24 Hrs  T(C): 36.9 (05-31-23 @ 08:43), Max: 36.9 (05-31-23 @ 08:43)  T(F): 98.4 (05-31-23 @ 08:43), Max: 98.4 (05-31-23 @ 08:43)  HR: --  BP: --  BP(mean): --  RR: 17 (05-31-23 @ 08:43) (17 - 17)  SpO2: --    Orthostatic VS  05-31-23 @ 08:43  Lying BP: --/-- HR: --  Sitting BP: 121/65 HR: 77  Standing BP: 118/64 HR: 83  Site: --  Mode: --  Orthostatic VS  05-30-23 @ 07:52  Lying BP: --/-- HR: --  Sitting BP: 107/68 HR: 69  Standing BP: 102/62 HR: 74  Site: --  Mode: --   Vital Signs Last 24 Hrs  T(C): 36.7 (06-02-23 @ 08:55), Max: 36.7 (06-02-23 @ 08:55)  T(F): 98 (06-02-23 @ 08:55), Max: 98 (06-02-23 @ 08:55)  HR: --  BP: --  BP(mean): --  RR: 20 (06-02-23 @ 08:55) (20 - 20)  SpO2: --    Orthostatic VS  06-02-23 @ 08:55  Lying BP: --/-- HR: --  Sitting BP: 133/72 HR: 67  Standing BP: 115/68 HR: 79  Site: --  Mode: --  Orthostatic VS  06-01-23 @ 07:51  Lying BP: --/-- HR: --  Sitting BP: 123/65 HR: 65  Standing BP: 116/64 HR: 69  Site: --  Mode: --

## 2023-05-31 NOTE — BH INPATIENT PSYCHIATRY PROGRESS NOTE - CURRENT MEDICATION
MEDICATIONS  (STANDING):  OLANZapine 5 milliGRAM(s) Oral at bedtime  sertraline 100 milliGRAM(s) Oral daily    MEDICATIONS  (PRN):  acetaminophen     Tablet .. 650 milliGRAM(s) Oral every 6 hours PRN Mild Pain (1 - 3), Moderate Pain (4 - 6), Severe Pain (7 - 10)  haloperidol     Tablet 5 milliGRAM(s) Oral every 6 hours PRN agitation  haloperidol    Injectable 5 milliGRAM(s) IntraMuscular once PRN severe agitation  hydrOXYzine hydrochloride 25 milliGRAM(s) Oral every 4 hours PRN Anxiety  LORazepam     Tablet 1 milliGRAM(s) Oral every 6 hours PRN anxiety  LORazepam   Injectable 2 milliGRAM(s) IntraMuscular once PRN severe anxiety  magnesium hydroxide Suspension 30 milliLiter(s) Oral daily PRN Constipation  melatonin. 3 milliGRAM(s) Oral at bedtime PRN Insomnia

## 2023-05-31 NOTE — BH INPATIENT PSYCHIATRY PROGRESS NOTE - NSBHASSESSSUMMFT_PSY_ALL_CORE
57 y/o single,  female, has 2 adult sons, domicile , with no significant medical hx, no formal psychiatric hx, has remote hx of sa via OD  at age 14 , stayed in the hospital one day, no other treatment and currently not in treatment. Patient denies any hx of NSSIB. she denies any hx of substance use, aggression or violence . Pt bib son for depression and si.     on assessment, patient remains depressed, with underlying irritability due to paranoid ideations,   tolerating medications, denied SI/I/P    Treatment Plan  1. admitted to unit, legal status 9.13  2. safety  - routine checks as pt denies SIIP/HIIP and is able to contract for safety  - haldol and ativan PO/IM PRN for agitation and anxiety  3. psychiatric  - increase  sertraline 50mg for MDD  - started olanzapine 2.5mg for MDD with psychotic features  4. medical  - pt denies  5. encourage I/G/M therapy 57 y/o single,  female, has 2 adult sons, domicile , with no significant medical hx, no formal psychiatric hx, has remote hx of sa via OD  at age 14 , stayed in the hospital one day, no other treatment and currently not in treatment. Patient denies any hx of NSSIB. she denies any hx of substance use, aggression or violence . Pt bib son for depression and si.     on assessment, patient remains depressed, with underlying irritability due to paranoid ideations,   tolerating medications, denied SI/I/P    Treatment Plan  1. admitted to unit, legal status 9.13  2. safety  - routine checks as pt denies SIIP/HIIP and is able to contract for safety  - haldol and ativan PO/IM PRN for agitation and anxiety  3. psychiatric  - increase sertraline from 50mg to 100mg daily for MDD  - increase olanzapine from 2.5mg to 5mg QHS for MDD with psychotic features  4. medical  - pt denies  5. encourage I/G/M therapy  6. SW to pursue discharge planning, coordinate care with family

## 2023-05-31 NOTE — BH TREATMENT PLAN - NSTXSUICIDINTERMD_PSY_ALL_CORE
medication management and education  started on sertraline and olanzapine for MDD with psychotic features
medication management and education  started on sertraline and olanzapine for MDD with psychotic features

## 2023-06-01 PROCEDURE — 99231 SBSQ HOSP IP/OBS SF/LOW 25: CPT

## 2023-06-01 RX ADMIN — OLANZAPINE 5 MILLIGRAM(S): 15 TABLET, FILM COATED ORAL at 20:51

## 2023-06-01 RX ADMIN — SERTRALINE 100 MILLIGRAM(S): 25 TABLET, FILM COATED ORAL at 09:31

## 2023-06-01 NOTE — BH INPATIENT PSYCHIATRY PROGRESS NOTE - NSBHCHARTREVIEWVS_PSY_A_CORE FT
Vital Signs Last 24 Hrs  T(C): 36.7 (06-02-23 @ 08:55), Max: 36.7 (06-02-23 @ 08:55)  T(F): 98 (06-02-23 @ 08:55), Max: 98 (06-02-23 @ 08:55)  HR: --  BP: --  BP(mean): --  RR: 20 (06-02-23 @ 08:55) (20 - 20)  SpO2: --    Orthostatic VS  06-02-23 @ 08:55  Lying BP: --/-- HR: --  Sitting BP: 133/72 HR: 67  Standing BP: 115/68 HR: 79  Site: --  Mode: --  Orthostatic VS  06-01-23 @ 07:51  Lying BP: --/-- HR: --  Sitting BP: 123/65 HR: 65  Standing BP: 116/64 HR: 69  Site: --  Mode: --

## 2023-06-01 NOTE — BH INPATIENT PSYCHIATRY PROGRESS NOTE - NSBHFUPINTERVALHXFT_PSY_A_CORE
Patient seen for follow up for depression with psychosis, chart reviewed, and case discussed with treatment team.  No events reported overnight.  VSS. Patient observed to be more visible and appropriate with staff and peers, reported mood is "good" with improved mood and no noted paranoia noted, no AH/VH, no delusions, no manic sx, no lability, no irritability.  Patient reported improved sleep and good appetite.  Patient denied SE to meds, tolerated increase in zyprexa of 5mg last night and given sertraline 100mg today, no EPS.  No agitation, no somatic complaints.  Open to discharge planning.

## 2023-06-01 NOTE — BH INPATIENT PSYCHIATRY PROGRESS NOTE - NSBHMETABOLIC_PSY_ALL_CORE_FT
BMI:   HbA1c: A1C with Estimated Average Glucose Result: 5.3 % (06-02-23 @ 08:57)    Glucose:   BP: --  Lipid Panel: Date/Time: 06-02-23 @ 08:57  Cholesterol, Serum: 201  Direct LDL: --  HDL Cholesterol, Serum: 62  Total Cholesterol/HDL Ration Measurement: --  Triglycerides, Serum: 137

## 2023-06-02 LAB
A1C WITH ESTIMATED AVERAGE GLUCOSE RESULT: 5.3 % — SIGNIFICANT CHANGE UP (ref 4–5.6)
ALBUMIN SERPL ELPH-MCNC: 4 G/DL — SIGNIFICANT CHANGE UP (ref 3.3–5)
ALP SERPL-CCNC: 76 U/L — SIGNIFICANT CHANGE UP (ref 40–120)
ALT FLD-CCNC: 15 U/L — SIGNIFICANT CHANGE UP (ref 4–33)
ANION GAP SERPL CALC-SCNC: 11 MMOL/L — SIGNIFICANT CHANGE UP (ref 7–14)
AST SERPL-CCNC: 18 U/L — SIGNIFICANT CHANGE UP (ref 4–32)
BASOPHILS # BLD AUTO: 0.06 K/UL — SIGNIFICANT CHANGE UP (ref 0–0.2)
BASOPHILS NFR BLD AUTO: 1.2 % — SIGNIFICANT CHANGE UP (ref 0–2)
BILIRUB SERPL-MCNC: 0.3 MG/DL — SIGNIFICANT CHANGE UP (ref 0.2–1.2)
BUN SERPL-MCNC: 15 MG/DL — SIGNIFICANT CHANGE UP (ref 7–23)
CALCIUM SERPL-MCNC: 8.9 MG/DL — SIGNIFICANT CHANGE UP (ref 8.4–10.5)
CHLORIDE SERPL-SCNC: 104 MMOL/L — SIGNIFICANT CHANGE UP (ref 98–107)
CHOLEST SERPL-MCNC: 201 MG/DL — HIGH
CO2 SERPL-SCNC: 25 MMOL/L — SIGNIFICANT CHANGE UP (ref 22–31)
CREAT SERPL-MCNC: 0.63 MG/DL — SIGNIFICANT CHANGE UP (ref 0.5–1.3)
EGFR: 102 ML/MIN/1.73M2 — SIGNIFICANT CHANGE UP
EOSINOPHIL # BLD AUTO: 0.15 K/UL — SIGNIFICANT CHANGE UP (ref 0–0.5)
EOSINOPHIL NFR BLD AUTO: 3 % — SIGNIFICANT CHANGE UP (ref 0–6)
ESTIMATED AVERAGE GLUCOSE: 105 — SIGNIFICANT CHANGE UP
GLUCOSE SERPL-MCNC: 87 MG/DL — SIGNIFICANT CHANGE UP (ref 70–99)
HCT VFR BLD CALC: 37.8 % — SIGNIFICANT CHANGE UP (ref 34.5–45)
HDLC SERPL-MCNC: 62 MG/DL — SIGNIFICANT CHANGE UP
HGB BLD-MCNC: 12.1 G/DL — SIGNIFICANT CHANGE UP (ref 11.5–15.5)
IANC: 2.66 K/UL — SIGNIFICANT CHANGE UP (ref 1.8–7.4)
IMM GRANULOCYTES NFR BLD AUTO: 0.2 % — SIGNIFICANT CHANGE UP (ref 0–0.9)
LIPID PNL WITH DIRECT LDL SERPL: 112 MG/DL — HIGH
LYMPHOCYTES # BLD AUTO: 1.72 K/UL — SIGNIFICANT CHANGE UP (ref 1–3.3)
LYMPHOCYTES # BLD AUTO: 33.9 % — SIGNIFICANT CHANGE UP (ref 13–44)
MCHC RBC-ENTMCNC: 28.3 PG — SIGNIFICANT CHANGE UP (ref 27–34)
MCHC RBC-ENTMCNC: 32 GM/DL — SIGNIFICANT CHANGE UP (ref 32–36)
MCV RBC AUTO: 88.5 FL — SIGNIFICANT CHANGE UP (ref 80–100)
MONOCYTES # BLD AUTO: 0.48 K/UL — SIGNIFICANT CHANGE UP (ref 0–0.9)
MONOCYTES NFR BLD AUTO: 9.4 % — SIGNIFICANT CHANGE UP (ref 2–14)
NEUTROPHILS # BLD AUTO: 2.66 K/UL — SIGNIFICANT CHANGE UP (ref 1.8–7.4)
NEUTROPHILS NFR BLD AUTO: 52.3 % — SIGNIFICANT CHANGE UP (ref 43–77)
NON HDL CHOLESTEROL: 139 MG/DL — HIGH
NRBC # BLD: 0 /100 WBCS — SIGNIFICANT CHANGE UP (ref 0–0)
NRBC # FLD: 0 K/UL — SIGNIFICANT CHANGE UP (ref 0–0)
PLATELET # BLD AUTO: 273 K/UL — SIGNIFICANT CHANGE UP (ref 150–400)
POTASSIUM SERPL-MCNC: 4.1 MMOL/L — SIGNIFICANT CHANGE UP (ref 3.5–5.3)
POTASSIUM SERPL-SCNC: 4.1 MMOL/L — SIGNIFICANT CHANGE UP (ref 3.5–5.3)
PROT SERPL-MCNC: 6.6 G/DL — SIGNIFICANT CHANGE UP (ref 6–8.3)
RBC # BLD: 4.27 M/UL — SIGNIFICANT CHANGE UP (ref 3.8–5.2)
RBC # FLD: 12.8 % — SIGNIFICANT CHANGE UP (ref 10.3–14.5)
SODIUM SERPL-SCNC: 140 MMOL/L — SIGNIFICANT CHANGE UP (ref 135–145)
TRIGL SERPL-MCNC: 137 MG/DL — SIGNIFICANT CHANGE UP
WBC # BLD: 5.08 K/UL — SIGNIFICANT CHANGE UP (ref 3.8–10.5)
WBC # FLD AUTO: 5.08 K/UL — SIGNIFICANT CHANGE UP (ref 3.8–10.5)

## 2023-06-02 PROCEDURE — 99231 SBSQ HOSP IP/OBS SF/LOW 25: CPT

## 2023-06-02 RX ADMIN — SERTRALINE 100 MILLIGRAM(S): 25 TABLET, FILM COATED ORAL at 08:43

## 2023-06-02 RX ADMIN — Medication 3 MILLIGRAM(S): at 21:51

## 2023-06-02 RX ADMIN — OLANZAPINE 5 MILLIGRAM(S): 15 TABLET, FILM COATED ORAL at 21:51

## 2023-06-02 NOTE — BH INPATIENT PSYCHIATRY PROGRESS NOTE - NSBHASSESSSUMMFT_PSY_ALL_CORE
57 y/o single,  female, has 2 adult sons, domicile , with no significant medical hx, no formal psychiatric hx, has remote hx of sa via OD  at age 14 , stayed in the hospital one day, no other treatment and currently not in treatment. Patient denies any hx of NSSIB. she denies any hx of substance use, aggression or violence . Pt bib son for depression and si.     on assessment, patient improving, less depressed, no longer with noted paranoid ideations,  tolerating medications, denied SI/I/P    Treatment Plan  1. admitted to unit, legal status 9.13  2. safety  - routine checks as pt denies SIIP/HIIP and is able to contract for safety  - haldol and ativan PO/IM PRN for agitation and anxiety  3. psychiatric  - continue with sertraline 100mg daily for MDD  - continue with olanzapine 5mg QHS for MDD with psychotic features - improved  4. medical  - pt denies  5. encourage I/G/M therapy  6. SW to pursue discharge planning, coordinate care with family 59 y/o single,  female, has 2 adult sons, domicile , with no significant medical hx, no formal psychiatric hx, has remote hx of sa via OD  at age 14 , stayed in the hospital one day, no other treatment and currently not in treatment. Patient denies any hx of NSSIB. she denies any hx of substance use, aggression or violence . Pt bib son for depression and si.     on assessment, patient improving, less depressed, no longer with noted paranoid ideations,  tolerating medications, denied SI/I/P    Treatment Plan  1. admitted to unit, legal status 9.13  2. safety  - routine checks as pt denies SIIP/HIIP and is able to contract for safety  - haldol and ativan PO/IM PRN for agitation and anxiety  3. psychiatric  - continue with sertraline 100mg daily for MDD  - continue with olanzapine 5mg QHS for MDD with psychotic features - improved  4. medical  - pt denies  5. encourage I/G/M therapy  6. SW to pursue discharge planning, coordinate care with family, family meeting scheduled for Monday 6/5

## 2023-06-02 NOTE — BH INPATIENT PSYCHIATRY PROGRESS NOTE - NSBHFUPINTERVALHXFT_PSY_A_CORE
Patient seen for follow up for depression with psychosis, chart reviewed, and case discussed with treatment team.  No events reported overnight.  VSS. Patient observed to be more visible and appropriate with staff and peers, reported mood is "good" with improved mood and no noted paranoia noted, no AH/VH, no delusions, no manic sx, no lability, no irritability.  Patient reported improved sleep and good appetite.  Patient denied SE to meds, tolerated increase in zyprexa of 5mg last night and given sertraline 100mg today, no EPS.  No agitation, no somatic complaints.  Open to discharge planning.  Patient seen for follow up for depression with psychosis, chart reviewed, and case discussed with treatment team.  No events reported overnight.  VSS. Patient observed to be more visible and appropriate with staff and peers, reported mood is "good" with no noted paranoia noted, no AH/VH, no delusions, no manic sx, no lability, no irritability.  Patient reported improved sleep and good appetite.  Patient denied SE to meds, no EPS.  No agitation, no somatic complaints.  Attending groups with benefit.  In no apparent distress, no somatic complaints.  Labs obtained and reviewed.

## 2023-06-02 NOTE — BH INPATIENT PSYCHIATRY PROGRESS NOTE - NSBHCHARTREVIEWVS_PSY_A_CORE FT
Vital Signs Last 24 Hrs  T(C): 36.7 (06-02-23 @ 08:55), Max: 36.7 (06-02-23 @ 08:55)  T(F): 98 (06-02-23 @ 08:55), Max: 98 (06-02-23 @ 08:55)  HR: --  BP: --  BP(mean): --  RR: 20 (06-02-23 @ 08:55) (20 - 20)  SpO2: --    Orthostatic VS  06-02-23 @ 08:55  Lying BP: --/-- HR: --  Sitting BP: 133/72 HR: 67  Standing BP: 115/68 HR: 79  Site: --  Mode: --  Orthostatic VS  06-01-23 @ 07:51  Lying BP: --/-- HR: --  Sitting BP: 123/65 HR: 65  Standing BP: 116/64 HR: 69  Site: --  Mode: --   Vital Signs Last 24 Hrs  T(C): 36.8 (06-04-23 @ 07:43), Max: 36.8 (06-04-23 @ 07:43)  T(F): 98.3 (06-04-23 @ 07:43), Max: 98.3 (06-04-23 @ 07:43)  HR: --  BP: --  BP(mean): --  RR: --  SpO2: --    Orthostatic VS  06-04-23 @ 07:43  Lying BP: --/-- HR: --  Sitting BP: 114/63 HR: 71  Standing BP: 112/66 HR: 91  Site: --  Mode: --  Orthostatic VS  06-03-23 @ 07:35  Lying BP: --/-- HR: --  Sitting BP: 108/58 HR: 67  Standing BP: 99/62 HR: 80  Site: --  Mode: --

## 2023-06-03 RX ADMIN — OLANZAPINE 5 MILLIGRAM(S): 15 TABLET, FILM COATED ORAL at 21:28

## 2023-06-03 RX ADMIN — SERTRALINE 100 MILLIGRAM(S): 25 TABLET, FILM COATED ORAL at 09:01

## 2023-06-04 RX ADMIN — SERTRALINE 100 MILLIGRAM(S): 25 TABLET, FILM COATED ORAL at 09:20

## 2023-06-04 RX ADMIN — OLANZAPINE 5 MILLIGRAM(S): 15 TABLET, FILM COATED ORAL at 21:03

## 2023-06-05 PROBLEM — Z00.00 ENCOUNTER FOR PREVENTIVE HEALTH EXAMINATION: Status: ACTIVE | Noted: 2023-06-05

## 2023-06-05 PROCEDURE — 99231 SBSQ HOSP IP/OBS SF/LOW 25: CPT

## 2023-06-05 RX ORDER — SERTRALINE 25 MG/1
1 TABLET, FILM COATED ORAL
Qty: 30 | Refills: 0
Start: 2023-06-05 | End: 2023-07-04

## 2023-06-05 RX ORDER — LANOLIN ALCOHOL/MO/W.PET/CERES
1 CREAM (GRAM) TOPICAL
Qty: 30 | Refills: 0
Start: 2023-06-05 | End: 2023-07-04

## 2023-06-05 RX ORDER — OLANZAPINE 15 MG/1
1 TABLET, FILM COATED ORAL
Qty: 30 | Refills: 0
Start: 2023-06-05 | End: 2023-07-04

## 2023-06-05 RX ADMIN — SERTRALINE 100 MILLIGRAM(S): 25 TABLET, FILM COATED ORAL at 08:21

## 2023-06-05 RX ADMIN — OLANZAPINE 5 MILLIGRAM(S): 15 TABLET, FILM COATED ORAL at 21:07

## 2023-06-05 RX ADMIN — Medication 3 MILLIGRAM(S): at 01:07

## 2023-06-05 NOTE — BH INPATIENT PSYCHIATRY PROGRESS NOTE - NSBHCHARTREVIEWVS_PSY_A_CORE FT
Vital Signs Last 24 Hrs  T(C): 36.7 (06-05-23 @ 07:37), Max: 36.7 (06-05-23 @ 07:37)  T(F): 98 (06-05-23 @ 07:37), Max: 98 (06-05-23 @ 07:37)  HR: --  BP: --  BP(mean): --  RR: 17 (06-05-23 @ 07:37) (17 - 17)  SpO2: --    Orthostatic VS  06-05-23 @ 07:37  Lying BP: --/-- HR: --  Sitting BP: 105/61 HR: 60  Standing BP: 101/64 HR: 79  Site: --  Mode: --  Orthostatic VS  06-04-23 @ 07:43  Lying BP: --/-- HR: --  Sitting BP: 114/63 HR: 71  Standing BP: 112/66 HR: 91  Site: --  Mode: --   Vital Signs Last 24 Hrs  T(C): 36.4 (06-06-23 @ 07:34), Max: 36.4 (06-06-23 @ 07:34)  T(F): 97.5 (06-06-23 @ 07:34), Max: 97.5 (06-06-23 @ 07:34)  HR: --  BP: --  BP(mean): --  RR: 17 (06-06-23 @ 07:34) (17 - 17)  SpO2: --    Orthostatic VS  06-06-23 @ 07:34  Lying BP: --/-- HR: --  Sitting BP: 115/65 HR: 88  Standing BP: 105/65 HR: 70  Site: --  Mode: --  Orthostatic VS  06-05-23 @ 07:37  Lying BP: --/-- HR: --  Sitting BP: 105/61 HR: 60  Standing BP: 101/64 HR: 79  Site: --  Mode: --

## 2023-06-05 NOTE — BH PSYCHOLOGY - GROUP THERAPY NOTE - NSBHPSYCHOLGRPFREQ_PSY_A_CORE
----- Message from Juliet King PA-C sent at 5/20/2020  3:26 PM CDT -----  Please notify the patient of normal results.  Recheck in 6 months per routine.  Follow-up as planned.  
Daily
Daily

## 2023-06-05 NOTE — BH DISCHARGE NOTE NURSING/SOCIAL WORK/PSYCH REHAB - NSCDUDCCRISIS_PSY_A_CORE
[facility]  1 [phone]) [facility]-WELL ([phone])  Text "WELL" to 43542  Website: www.[employer].Netrounds/.National Suicide Prevention Lifeline 1 (747) 294-1146/.  [facility]  1 (219) LIFEN[facility] (348-2312)/.  [facility]  [address] [place] [phone]   Hours:  Monday through Friday from 9 AM to 3 PM

## 2023-06-05 NOTE — BH PSYCHOLOGY - GROUP THERAPY NOTE - NSPSYCHOLGRPDBTMIND_PSY_A_CORE FT
N/A
Writer provided an urge surfing mindfulness meditation at the beginning of group.
Writer facilitated a mindfulness activity in the beginning of group in which all group members worked together to create a story using one word at a time.

## 2023-06-05 NOTE — BH SAFETY PLAN - WARNING SIGN 1
"I think it's images. Everything that surrounds me feels overwhelming. Everything is enclosed, feeling closed in."

## 2023-06-05 NOTE — BH PSYCHOLOGY - CLINICIAN PSYCHOTHERAPY NOTE - NSBHPSYCHOLINT_PSY_A_CORE
Dynamic issues addressed/Encourage medication compliance/Problem-solving techniques discussed/Reality testing/Stress management/Supported coping skills/Supportive therapy/Treatment compliance encouraged

## 2023-06-05 NOTE — BH DISCHARGE NOTE NURSING/SOCIAL WORK/PSYCH REHAB - NSDCPEWEB_GEN_ALL_CORE
[facility] for Tobacco Control website --- [email or web address] website --- [email or web address]

## 2023-06-05 NOTE — BH INPATIENT PSYCHIATRY PROGRESS NOTE - NSBHFUPINTERVALHXFT_PSY_A_CORE
Patient seen for follow up for depression with psychosis, chart reviewed, and case discussed with treatment team.  No events reported overnight.  VSS. Patient observed to be more visible and appropriate with staff and peers, reported mood is "good" with no noted paranoia noted, no AH/VH, no delusions, no manic sx, no lability, no irritability.  Patient reported improved sleep and good appetite.  Patient denied SE to meds, no EPS.  No agitation, no somatic complaints.  Attending groups with benefit.  In no apparent distress, no somatic complaints.  Labs obtained and reviewed. Patient seen for follow up for depression with psychosis, chart reviewed, and case discussed with treatment team.  No events reported overnight or over the weekend.  VSS. Patient observed to be visible on unit and appropriate with staff and peers, denies depressed mood, no noted paranoia noted, no AH/VH, no delusions, no manic sx, no lability, no irritability.  Patient reported fair sleep and good appetite.  Patient denied SE to meds, no EPS.  No agitation, no somatic complaints.  Attending groups with benefit.  In no apparent distress, no somatic complaints.  Call placed to son Forrest with SW who reports noted improvement.  Treatment plan and prognosis discussed, plan for discharge is reviewed, no safety concerns are expressed.

## 2023-06-05 NOTE — BH SAFETY PLAN - THE ONE THING THAT IS MOST IMPORTANT TO ME AND WORTH LIVING FOR IS:
"My life, my family, my God, my friends. The fact that I'm here enjoying life. Enjoying family, my grandchildren, Demarcus and Asael."

## 2023-06-05 NOTE — BH PSYCHOLOGY - GROUP THERAPY NOTE - NSPSYCHOLGRPDBTINT_PSY_A_CORE
review emotion regulation homework
reviewed interpersonal effectiveness homework
reviewed distress tolerance, skills homework

## 2023-06-05 NOTE — BH PSYCHOLOGY - GROUP THERAPY NOTE - NSBHPSYCHOLRESPONSE_PSY_A_CORE
Symptoms reduced/Coping skills acquired/Insight displayed/Accepted support
Coping skills acquired/Insight displayed/Accepted support
Symptoms reduced/Coping skills acquired/Insight displayed/Accepted support
Symptoms reduced/Coping skills acquired/Insight displayed/Accepted support
Coping skills acquired/Insight displayed/Accepted support

## 2023-06-05 NOTE — BH INPATIENT PSYCHIATRY PROGRESS NOTE - NSTXSUICIDGOAL_PSY_ALL_CORE
Will identify and utilize 2 coping skills
Be able to report that they independently used a coping skill instead of hurting oneself
Will identify and utilize 2 coping skills

## 2023-06-05 NOTE — BH INPATIENT PSYCHIATRY PROGRESS NOTE - NSBHATTESTBILLING_PSY_A_CORE
54348-Wvenrlkqaj OBS or IP - low complexity OR 25-34 mins
17663-Lfgjckdtiu OBS or IP - low complexity OR 25-34 mins
38894-Cryuauuqwh OBS or IP - low complexity OR 25-34 mins
52208-Qlamfvjgew OBS or IP - low complexity OR 25-34 mins
45720-Snttwiqczd OBS or IP - low complexity OR 25-34 mins
35273-Dzvabaxybb OBS or IP - low complexity OR 25-34 mins
45887-Odxpqjgngf OBS or IP - low complexity OR 25-34 mins
34224-Rnmolslalb OBS or IP - low complexity OR 25-34 mins

## 2023-06-05 NOTE — BH PSYCHOLOGY - CLINICIAN PSYCHOTHERAPY NOTE - NSBHPSYCHOLGOALS_PSY_A_CORE
Assessment/Improve level of independent functioning/Improve social/vocational/coping skills/Prevent relapse/Psychoeducation/Treatment compliance

## 2023-06-05 NOTE — BH PSYCHOLOGY - GROUP THERAPY NOTE - NSPSYCHOLGRPDBTTOL_PSY_A_CORE FT
N/A
N/A
Patient attended the DBT Skills Acquisition group today, which takes place daily and is invite only. Group began with introductions and a mindfulness exercise. Today's group focused on Turning the Mind and Willingness in the Distress Tolerance module. Group discussed the definitions of turning the mind and willingness, why it is important to engage in turning the mind toward acceptance, how to turn the mind and be willing, and challenges to doing so. Group members took turns reading aloud from the group handouts and engaged in discussion about today's skills. Patients also provided examples of which situations turning the mind toward acceptance could be applied to and related them to their personal experiences.  encouraged group members to practice the skill outside of group using the practice worksheet.

## 2023-06-05 NOTE — BH DISCHARGE NOTE NURSING/SOCIAL WORK/PSYCH REHAB - DISCHARGE INSTRUCTIONS AFTERCARE APPOINTMENTS
In order to check the location, date, or time of your aftercare appointment, please refer to your Discharge Instructions Document given to you upon leaving the hospital.  If you have lost the instructions please call [phone]

## 2023-06-05 NOTE — BH PSYCHOLOGY - GROUP THERAPY NOTE - NSBHPSYCHOLASSESSPROV_PSY_A_CORE
Psychology Trainee only
Licensed Psychologist and Psychology Trainee

## 2023-06-05 NOTE — BH PSYCHOLOGY - GROUP THERAPY NOTE - NSPSYCHOLGRPDBTEMOT_PSY_A_CORE FT
N/A
Patient attended the morning DBT skills group, and the group focused on the emotion regulation skill of accumulating positive emotions in the long term. The group discussed the steps to make changes in their lives that align with their values and building a life worth living, including avoid avoiding, identify values that are important to us, identify one value to work on now, identify a few goals related to this value, choose one goal to work on now, identify small action steps toward the goal, and take one action step now. The group discussed different examples of values and goals that matter to them. Group members demonstrated their understanding through active participation and discussion. Worksheets to practice getting from values to specific action steps were distributed for patients to work on.
N/A

## 2023-06-05 NOTE — BH DISCHARGE NOTE NURSING/SOCIAL WORK/PSYCH REHAB - PATIENT PORTAL LINK FT
You can access the FollowMyHealth Patient Portal offered by [facility] by registering at the following website: [email or web address]. By joining [email or web address]’s FollowMyHealth portal, you will also be able to view your health information using other applications (apps) compatible with our system.

## 2023-06-05 NOTE — BH INPATIENT PSYCHIATRY PROGRESS NOTE - NSTXSUICIDINTERMD_PSY_ALL_CORE
medication management and education  started on sertraline and olanzapine for MDD with psychotic features
no
medication management and education  started on sertraline and olanzapine for MDD with psychotic features

## 2023-06-05 NOTE — BH PSYCHOLOGY - GROUP THERAPY NOTE - NSPSYCHOLGRPDBTPT_PSY_A_CORE
stable mood and affect in group/patient showing good behavior control/Patient able to identify mood states
stable mood and affect in group/patient showing good behavior control/no self-destructive impulses/behaviors
stable mood and affect in group/patient showing good behavior control/Patient able to identify mood states

## 2023-06-05 NOTE — BH INPATIENT PSYCHIATRY PROGRESS NOTE - NSICDXBHPRIMARYDX_PSY_ALL_CORE
MDD (major depressive disorder), single episode, severe with psychotic features   F32.3  

## 2023-06-05 NOTE — BH PSYCHOLOGY - GROUP THERAPY NOTE - NSPSYCHOLGRPDBTPROB_PSY_A_CORE
anger/anxiety/depressed mood/emotional dysregulation/irritability/labile affect/social isolation
anger/anxiety/dependency/depressed mood/emotional dysregulation/labile affect/social isolation
anger/anxiety/dependency/emotional dysregulation/impulsive behaviors/irritability/labile affect/poor judgment

## 2023-06-05 NOTE — BH PSYCHOLOGY - GROUP THERAPY NOTE - NSPSYCHOLGRPDBTINTR_PSY_A_CORE FT
N/A
N/A
Patient attended the morning DBT skills group, and the group focused on the interpersonal effectiveness skill of dialectics. The group discussed what dialectics mean and how to think and act dialectically. The group used different examples of dialectics and how to practice balancing opposites that can both be true, such as in difficult interpersonal issues. Group members demonstrated their understanding through active participation and discussion. Worksheets to practice using a dialectics checklist were distributed for patients to work on.

## 2023-06-05 NOTE — BH INPATIENT PSYCHIATRY PROGRESS NOTE - NSBHATTESTTYPEVISIT_PSY_A_CORE
Attending Only
LE without on-site Attending supervision
LE without on-site Attending supervision
Attending Only
LE without on-site Attending supervision
Attending Only

## 2023-06-05 NOTE — BH INPATIENT PSYCHIATRY PROGRESS NOTE - NSBHMSESPEECH_PSY_A_CORE
Abnormal as indicated, otherwise normal...
Normal volume, rate, productivity, spontaneity and articulation
Abnormal as indicated, otherwise normal...
Normal volume, rate, productivity, spontaneity and articulation
Abnormal as indicated, otherwise normal...
Normal volume, rate, productivity, spontaneity and articulation

## 2023-06-05 NOTE — BH INPATIENT PSYCHIATRY PROGRESS NOTE - PRN MEDS
MEDICATIONS  (PRN):  acetaminophen     Tablet .. 650 milliGRAM(s) Oral every 6 hours PRN Mild Pain (1 - 3), Moderate Pain (4 - 6), Severe Pain (7 - 10)  haloperidol     Tablet 5 milliGRAM(s) Oral every 6 hours PRN agitation  haloperidol    Injectable 5 milliGRAM(s) IntraMuscular once PRN severe agitation  hydrOXYzine hydrochloride 25 milliGRAM(s) Oral every 4 hours PRN Anxiety  LORazepam     Tablet 1 milliGRAM(s) Oral every 6 hours PRN anxiety  LORazepam   Injectable 2 milliGRAM(s) IntraMuscular once PRN severe anxiety  magnesium hydroxide Suspension 30 milliLiter(s) Oral daily PRN Constipation  melatonin. 3 milliGRAM(s) Oral at bedtime PRN Insomnia  

## 2023-06-05 NOTE — BH PSYCHOLOGY - CLINICIAN PSYCHOTHERAPY NOTE - NSBHPSYCHOLNARRATIVE_PSY_A_CORE FT
Writer met with patient for first individual psychotherapy session. Writer and patient discussed patient's progress, discharge details, and outpatient plans as patient is anticipated to be discharged tomorrow, 06/06/2023. Patient reported that she feels "much better," stating that she has "a new perspective" and "a new appreciation for on life." Patient attributed her improvement to "going to DBT classes, emotion regulation classes, noticing [her] triggers, and being mindful." Writer and patient discussed patient's psychiatric history as well as contributing factors which led to her "mental breakdown" prior to her current hospitalization. Writer and patient then discussed patient's perception of and worries about stigma related to psychiatric distress. Patient fully completed the safety plan at this time and was informed that she will receive a copy of her safety plan at the time of her discharge. Patient was able to identify warning signs, coping strategies, a support network, and future-oriented goals. Patient identified her "life, family, God, and friends" as protective factors at this time. Patient demonstrated future-oriented thinking and goals by discussing her short-term goals (being "a new Allison" after discharge and integrating back into her life in the community "mindfully and very slowly") as well as long-term goals (enhancing her relationships with her children and grandchildren). Patient reported feeling satisfied with the content discussed in session and with her completed safety plan. Writer reviewed patient's identified coping strategies: mindfulness, wise mind, and prayer from [employer]. Writer connected how such skills can be integrated with the Emotion Regulation skills of [employer] and [place] [employer] as patient moves forward in her treatment as an outpatient. Writer provided psychoeducation by assessing patient's continued readiness and willingness to discuss/process emotionally challenging content in her outpatient psychotherapy. Patient reported being open, willing, and motivated to process such content as she is able to do so. Patient identified the following ways she can make her environment safe: first reaching out and seeking support from her sons, and then engaging in reality testing by checking the facts. Patient inquired as to whether she could receive a letter from the team so that she could justify her absence from work. Patient specifically requested that this letter not include the word "institutionalized," but rather use non-stigmatizing language. Writer offered to advocate for patient's reported need by discussing with patient's . Patient stated that she will discuss her need for a letter with her  tomorrow before discharge. Writer reinforced the importance of patient continuing to practice identified skills in her day-to-day life as well as in her outpatient care. Patient was receptive to these interventions at this time.     Patient fully participated in psychotherapy session. Patient presented as open, cooperative, engaged, and well-related. Patient presented with average grooming and was casually dressed. It was observed that patient maintained appropriate eye contact. No abnormal motor movements noted and patient's speech was observed to be within normal limits. Patient presented as euthymic. Patient displayed a range of affect which was congruent to her reported mood and which was appropriate to the topics discussed during session. It was observed that patient’s thought process was linear, coherent, logical, goal-oriented, and relevant to the topic at hand. No perceptual disturbances noted or observed. Patient did not endorse SI, intent, or plan at this time. Patient did not endorse any self-injurious thoughts or urges at this time.

## 2023-06-05 NOTE — BH INPATIENT PSYCHIATRY PROGRESS NOTE - NSBHASSESSSUMMFT_PSY_ALL_CORE
59 y/o single,  female, has 2 adult sons, domicile , with no significant medical hx, no formal psychiatric hx, has remote hx of sa via OD  at age 14 , stayed in the hospital one day, no other treatment and currently not in treatment. Patient denies any hx of NSSIB. she denies any hx of substance use, aggression or violence . Pt bib son for depression and si.     on assessment, patient improving, less depressed, no longer with noted paranoid ideations,  tolerating medications, denied SI/I/P    Treatment Plan  1. admitted to unit, legal status 9.13  2. safety  - routine checks as pt denies SIIP/HIIP and is able to contract for safety  - haldol and ativan PO/IM PRN for agitation and anxiety  3. psychiatric  - continue with sertraline 100mg daily for MDD  - continue with olanzapine 5mg QHS for MDD with psychotic features - improved  4. medical  - pt denies  5. encourage I/G/M therapy  6. SW to pursue discharge planning, coordinate care with family, family meeting scheduled for Monday 6/5 57 y/o single,  female, has 2 adult sons, domicile , with no significant medical hx, no formal psychiatric hx, has remote hx of sa via OD  at age 14 , stayed in the hospital one day, no other treatment and currently not in treatment. Patient denies any hx of NSSIB. she denies any hx of substance use, aggression or violence . Pt bib son for depression and si.     on assessment, patient improving, less depressed, no longer with noted paranoid ideations,  tolerating medications, denied SI/I/P    Treatment Plan  1. admitted to unit, legal status 9.13  2. safety  - routine checks as pt denies SIIP/HIIP and is able to contract for safety  - haldol and ativan PO/IM PRN for agitation and anxiety  3. psychiatric  - continue with sertraline 100mg daily for MDD - escripts sent to [facility]  - continue with olanzapine 5mg QHS for MDD with psychotic features - improved  4. medical  - pt denies  5. encourage I/G/M therapy  6. SW to pursue discharge planning, coordinate care with family, dc anticipated for tomorrow

## 2023-06-05 NOTE — BH PSYCHOLOGY - GROUP THERAPY NOTE - NSPSYCHOLGRPGENPT_PSY_A_CORE FT
Patients attended Women's process group. The group started with n introduction where participants introduced themselves and identified one thing that they were grateful for. Pts were well engaged and found the gratitude exercise to be helpful. Next, the pts discussed a miscellaneous topics on the importance of diagnosis, what it means to have a chronic mental illness and receive long term treatment, and ways to incorporate healthy lifestyles to maintain mental health gains. Pts provided personal examples, their own opinions about the topic, and supported one another by showing a different perspective, instilling hope, and sharing strategies.  guided the discussion, provided perspective to reframe negative beliefs around mental health treatment, and provided trauma informed care and psychoeducation. 
Patient attended the women’s issues group today.  and patients took turns leading parts of their favorite mindfulness practices as part of today mindfulness activity to generalize skills learned so far in other groups. Patients provided positive feedback and reflections on the activities. For rest of group, patients engaged in a discussion about how to balance hormones and manage emotions during cycles. Group concluded with an overview of a self-exploration based on values handout which was distributed in group. Patients were encouraged to complete the handout in their own time and reflect on things/approaches they want to follow post-discharge to find mind-body balance in their lives.  provided psychoeducation and support as needed.

## 2023-06-05 NOTE — BH DISCHARGE NOTE NURSING/SOCIAL WORK/PSYCH REHAB - NSDCADDINFO1FT_PSY_ALL_CORE
The appointment will last 90 minutes or longer.    Please bring insurance cards as well as a valid photo Identification.

## 2023-06-05 NOTE — BH INPATIENT PSYCHIATRY PROGRESS NOTE - NSTXDCOTHRGOAL_PSY_ALL_CORE
Pt became psychotic after experiencing a significant depressive episode
Pt became psychotic after experiencing a significant depressive episode
Pt has had a SA i/s/o unclear decompensation
Pt became psychotic after experiencing a significant depressive episode
Pt has had a SA i/s/o unclear decompensation

## 2023-06-05 NOTE — BH PSYCHOLOGY - GROUP THERAPY NOTE - TOKEN PULL-DIAGNOSIS
Primary Diagnosis:  MDD (major depressive disorder), single episode, severe with psychotic features [F32.3]        Problem Dx:   MDD (major depressive disorder), single episode, severe with psychotic features [F32.3]      
Primary Diagnosis:  MDD (major depressive disorder), single episode, severe with psychotic features [F32.3]        Problem Dx:   MDD (major depressive disorder), single episode, severe with psychotic features [F32.3]      
Primary Diagnosis:    Problem Dx:   MDD (major depressive disorder), single episode, severe with psychotic features [F32.3]      
Primary Diagnosis:  MDD (major depressive disorder), single episode, severe with psychotic features [F32.3]        Problem Dx:   MDD (major depressive disorder), single episode, severe with psychotic features [F32.3]      
Primary Diagnosis:  MDD (major depressive disorder), single episode, severe with psychotic features [F32.3]        Problem Dx:   MDD (major depressive disorder), single episode, severe with psychotic features [F32.3]

## 2023-06-05 NOTE — BH PSYCHOLOGY - GROUP THERAPY NOTE - NSPSYCHOLGRPGENINT_PSY_A_CORE
stress management/supported coping skills/supportive therapy
problem solving techniques discussed/stress management/supported coping skills/supportive therapy

## 2023-06-05 NOTE — BH PSYCHOLOGY - GROUP THERAPY NOTE - NSBHPSYCHOLGRPNAME_PSY_A_CORE
Women’s Issues
DBT (Dialectical Behavior Therapy) Group
DBT (Dialectical Behavior Therapy) Group
Women’s Issues
DBT (Dialectical Behavior Therapy) Group

## 2023-06-05 NOTE — BH PSYCHOLOGY - GROUP THERAPY NOTE - NSPSYCHOLGRPGENGOAL_PSY_A_CORE
improve level of independent functioning/improve social/vocational/coping skills
improve level of independent functioning/improve social/vocational/coping skills/psychoeducation

## 2023-06-05 NOTE — BH DISCHARGE NOTE NURSING/SOCIAL WORK/PSYCH REHAB - NSDCPEEMAIL_GEN_ALL_CORE
[facility] email [email or web address]

## 2023-06-05 NOTE — BH PSYCHOLOGY - GROUP THERAPY NOTE - NSPSYCHOLGRPDBTGOAL_PSY_A_CORE
reduce mood and affective lability/reduce interpersonal conflicts/improve ability to indentify feelings/improve ability to communicate feelings/reduce vulnerability to emotional dysregualation/promote skills to reduce anger
reduce mood and affective lability/improve ability to indentify feelings/reduce vulnerability to emotional dysregualation/promote skills to reduce anger
reduce mood and affective lability/reduce impulsive self-defeating behavior/reduce interpersonal conflicts/improve ability to indentify feelings/improve ability to communicate feelings/improve ability re: assertive/set limits/reduce vulnerability to emotional dysregualation/promote skills to reduce anger

## 2023-06-05 NOTE — BH PSYCHOLOGY - GROUP THERAPY NOTE - NSBHPSYCHOLPARTICIPCOMMENT_PSY_A_CORE FT
Patient was attentive, cooperative, and engaged. Patient actively and appropriately participated and answered the 's questions for the group and discussed her own experiences.
Pt was fully engaged and meaningfully participated in group discussion. She was pleasant and supportive to others and provided strategies to her peers. 
Patient was attentive, cooperative, and engaged. Patient actively and appropriately participated and answered the 's questions for the group and discussed her own experiences.
Patient arrived on-time to group and presented as open, cooperative, engaged, and well-related in group. During the skills check-in, patient reported that she has been practicing a DBT skill, wise mind. Patient participated spontaneously by sharing feedback on the mindfulness exercise, by sharing insights related to the group discussion, and by discussing the appropriate usage of the skills discussed in her day-to-day life. Patient appeared to accept support from . 
Patient arrived on-time to group. She participated spontaneously by leading her favorite mindfulness exercise with the group. Patient was cooperative, appeared to accept support from others, and offered support and validation to other participants.

## 2023-06-06 VITALS — RESPIRATION RATE: 17 BRPM | TEMPERATURE: 98 F

## 2023-06-06 PROCEDURE — 99238 HOSP IP/OBS DSCHRG MGMT 30/<: CPT

## 2023-06-06 RX ADMIN — SERTRALINE 100 MILLIGRAM(S): 25 TABLET, FILM COATED ORAL at 08:21

## 2023-06-06 NOTE — BH INPATIENT PSYCHIATRY DISCHARGE NOTE - ATTENDING DISCHARGE PHYSICAL EXAMINATION:
Care was discussed and reviewed in interdisciplinary treatment team.  I, Brittni Pennington MD, have reviewed and verified the documentation.  I independently performed the documented medical decision making.    Patient was seen and evaluated today by this writer. Patient reported that she is feeling much better and is looking forward to be able to leave the hospital. Patient is denying any SI and has been able to contract for safety. Denies any HI or hallucinations and no delusions have been elicited. Patient has been educated about the safety plan and she understands that if at any time she feels like harming her self or harming others she can call 911 or go to any ER. During the hospital stay patient has not demonstrated any aggressive or self injurious behaviors and this has been consistent with my observations and the observations of the staff.

## 2023-06-06 NOTE — BH INPATIENT PSYCHIATRY DISCHARGE NOTE - NSDCCPCAREPLAN_GEN_ALL_CORE_FT
PRINCIPAL DISCHARGE DIAGNOSIS  Diagnosis: MDD (major depressive disorder), single episode, severe with psychosis  Assessment and Plan of Treatment:

## 2023-06-06 NOTE — BH INPATIENT PSYCHIATRY DISCHARGE NOTE - NSDCMRMEDTOKEN_GEN_ALL_CORE_FT
melatonin 3 mg oral tablet: 1 tab(s) orally once a day (at bedtime) as needed for Insomnia  OLANZapine 5 mg oral tablet: 1 tab(s) orally once a day (at bedtime)  sertraline 100 mg oral tablet: 1 tab(s) orally once a day   melatonin 3 mg oral tablet: 1 tab(s) orally once a day (at bedtime) as needed for  insomnia  OLANZapine 15 mg oral tablet: 1 tab(s) orally once a day (at bedtime)  sertraline 25 mg oral tablet: 3 tab(s) orally once a day

## 2023-06-06 NOTE — BH INPATIENT PSYCHIATRY DISCHARGE NOTE - NSBHASSESSSUMMFT_PSY_ALL_CORE
57 y/o single,  female, has 2 adult sons, domicile , with no significant medical hx, no formal psychiatric hx, has remote hx of sa via OD  at age 14 , stayed in the hospital one day, no other treatment and currently not in treatment. Patient denies any hx of NSSIB. she denies any hx of substance use, aggression or violence . Pt bib son for depression and si.     on assessment, patient improving, less depressed, no longer with noted paranoid ideations,  tolerating medications, denied SI/I/P    Treatment Plan  1. admitted to unit, legal status 9.13  2. safety  - routine checks as pt denies SIIP/HIIP and is able to contract for safety  - haldol and ativan PO/IM PRN for agitation and anxiety  3. psychiatric  - continue with sertraline 100mg daily for MDD - escripts sent to [facility]  - continue with olanzapine 5mg QHS for MDD with psychotic features - improved  4. medical  - pt denies  5. encourage I/G/M therapy  6. SW to pursue discharge planning, coordinate care with family, dc anticipated for tomorrow

## 2023-06-06 NOTE — BH INPATIENT PSYCHIATRY DISCHARGE NOTE - HPI (INCLUDE ILLNESS QUALITY, SEVERITY, DURATION, TIMING, CONTEXT, MODIFYING FACTORS, ASSOCIATED SIGNS AND SYMPTOMS)
57 y/o single,  female, has 2 adult sons, domicile , with no significant medical hx, no formal psychiatric hx, has remote hx of sa via OD  at age 14 , stayed in the hospital one day, no other treatment and currently not in treatment. Patient denies any hx of NSSIB. she denies any hx of substance use, aggression or violence . Pt bib son for depression and si.     On 2W: pt reports that she has been struggling with "anxiety, depression, and confusion" for the past two weeks. 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 Pt states her depression focuses around "poor concentration, memory, and motivation." Pt reports trouble with sleep because of this and often feels unrested when she wakes. Appetite has also been negatively impacted and she has been eating less. Other depressive symptoms include anhedonia and lack of interest. She reports that she feels the world has gone "topsy turvey and upside down" and that she has feelings of "people controlling my mind." She denies AVH. She is unable to attribute worsening depression and anxiety to anything in particular and states that she "snapped," leading her to take nyquil and cut self. L wrist assessed with noted superficial cuts, now scabbed. Pt denies pain at cite at this time. Pt denies other cuts on body with none noted. Pt reports that she does not have a history of SI/SA and that this is her first attempt, not elaborating on prior OD when asked. At this time, pt denies SI/HI.     Per ED: On assessment pt is calm, cooperative . She reports in the past week she has been feeling "like my life is being played out in front of me ", "everything seems to be off ". Patient explains she feels like a negative force  is pulling her, and has been feeling overwhelmed by the feelings . Last night she felt anxious and is endorsing having a panic attack, "the walls were caving in ", and she felt sob , and in the moment felt she wanted to die , the feeling prompting her to drink 1/2 bottle of Nyquil in sa . When she saw that Nyquil  did not effect her she took a knife and tried to cut her left  wrists with intention to kill herself . (pt has small superficial cuts on the left wrist , no sutures required). Patient reports stopping herself from cutting deeper because she felt tired and sleepy and went to bed. This morning she felt "mixed emotions" and then states "like I am getting closer to the end of my own existence ". She denies having si prior to last night .  She told her friends today  about her sa and they informed her son who brought pt to the hospital. Pt is unable to identify any specific triggers for this episode .   Pt reports depressive sx x1 week , including decrease energy level, poor appetite  , lost 10 lbs in the past few weeks , poor sleep explaining that the sleep is restless and gets about 4-5 hrs/night, +anhedonia, loss of interest in doing things including reading books and watching tv, she is endorsing hopelessness and passive si , but denies any active si/i/p. Patient denies any hi/i/p. She admits to having suspiciousness and paranoia that she is being watched . She denies ah/vh . No manic sx including elevated mood, decrease need for sleep, grandiosity.

## 2023-06-06 NOTE — BH INPATIENT PSYCHIATRY DISCHARGE NOTE - HOSPITAL COURSE
57 y/o single,  female, has 2 adult sons, domicile , with no significant medical hx, no formal psychiatric hx, has remote hx of sa via OD  at age 14 , stayed in the hospital one day, no other treatment and currently not in treatment. Patient denies any hx of NSSIB. she denies any hx of substance use, aggression or violence . Pt bib son for depression and si.  Admitted voluntarily to .    on assessment, patient improving, less depressed, no longer with noted paranoid ideations,  tolerating medications, denied SI/I/P  - continue with sertraline 100mg daily for MDD - escripts sent to [facility]  - continue with olanzapine 5mg QHS for MDD with psychotic features - improved 57 y/o single,  female, has 2 adult sons, domicile , with no significant medical hx, no formal psychiatric hx, has remote hx of sa via OD  at age 14 , stayed in the hospital one day, no other treatment and currently not in treatment. Patient denies any hx of NSSIB. she denies any hx of substance use, aggression or violence . Pt bib son for depression and si.  Admitted voluntarily.  Patient observed to be visible on unit and appropriate with staff and peers, denies depressed mood, no noted paranoia noted, no AH/VH, no delusions, no manic sx, no lability, no irritability.  Patient reported fair sleep and good appetite.  Patient denied SE to meds, no EPS.  No agitation, no somatic complaints.    On discharge patient improving, less depressed, no longer with noted paranoid ideations,  tolerating medications, denied SI/I/P  - continue with sertraline 100mg daily for MDD - escripts sent to [facility] pharmacy  - continue with olanzapine 5mg QHS for MDD with psychotic features - improved    Aftercare Appointment  [facility]  12-Jun-2023 11:00  [id number] [facility]   [address] [place] [address]  [phone]  Mental Health Treatment

## 2023-06-06 NOTE — BH INPATIENT PSYCHIATRY DISCHARGE NOTE - NSBHFUPINTERVALHXFT_PSY_A_CORE
Patient seen for follow up for depression with psychosis, chart reviewed, and case discussed with treatment team.  No events reported overnight or over the weekend.  VSS. Patient observed to be visible on unit and appropriate with staff and peers, denies depressed mood, no noted paranoia noted, no AH/VH, no delusions, no manic sx, no lability, no irritability.  Patient reported fair sleep and good appetite.  Patient denied SE to meds, no EPS.  No agitation, no somatic complaints.  Attending groups with benefit.  In no apparent distress, no somatic complaints.  Call placed to son Forrest with SW who reports noted improvement.  Treatment plan and prognosis discussed, plan for discharge is reviewed, no safety concerns are expressed.

## 2023-06-06 NOTE — BH INPATIENT PSYCHIATRY DISCHARGE NOTE - OTHER PAST PSYCHIATRIC HISTORY (INCLUDE DETAILS REGARDING ONSET, COURSE OF ILLNESS, INPATIENT/OUTPATIENT TREATMENT)
Pt is a 59 y/o single,  female, has 2 adult sons, domiciled , with no significant medical hx, no formal psychiatric hx, has remote hx of sa via OD  at age 14 , stayed in the hospital one day, no other treatment and currently not in treatment. Patient denies any hx of NSSIB. she denies any hx of substance use, aggression or violence . Pt bib son for depression and si.   In ED, pt reported that in the past week she has been feeling "like my life is being played out in front of me ", "everything seems to be off ". Patient explains she feels like a negative force  is pulling her, and has been feeling overwhelmed by the feelings . Last night she felt anxious and is endorsing having a panic attack, "the walls were caving in ", and she felt SOB, and in the moment felt she wanted to die. This feeling prompted her to drink 1/2 bottle of Nyquil in a SA . When she saw that Nyquil  did not effect her she took a knife and tried to cut her left  wrists with intention to kill herself . (pt has small superficial cuts on the left wrist , no sutures required). Patient reports stopping herself from cutting deeper because she felt tired and sleepy and went to bed. This morning she felt "mixed emotions" and then states "like I am getting closer to the end of my own existence ". She denies having SI prior to that episode.  Pt told her friends about her SA when they were at her house, and they informed her son who brought pt to the hospital. Pt is unable to identify any specific triggers for this episode .   Pt reports depressive sx x1 week , including decrease energy level, poor appetite  , lost 10 lbs in the past few weeks , poor sleep explaining that the sleep is restless and gets about 4-5 hrs/night, +anhedonia, loss of interest in doing things including reading books and watching tv, she is endorsing hopelessness and passive si , but denies any active si/i/p. Patient denies any hi/i/p. She admits to having suspiciousness and paranoia that she is being watched . She denies ah/vh . No manic sx including elevated mood, decrease need for sleep, grandiosity.    The following information is per son, Glen:  Glen reports patient had friends over and patient shared that she harmed herself yesterday with a knife. Son reports patient has presented bizarre since Monday. Patient was scheduled to go to [facility] on Monday but cancelled due to knots in her stomach. Son says the patient mentioned her life insurance to the son this week which was odd. Patient reports feeling in a fog and is stressed. He reports the patient presents hopeless and has made statements today such as “ I don’t see my purpose here, I don’t want to be here anymore, nobody is helping me and everyone is against me”. Son says patient has no hx of si and no prior suicide attempts. He reports the patient is not paranoid or delusional. He says the patient denies any Avh. 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 He feels this week she has been more sheltered.

## 2023-06-21 NOTE — SOCIAL WORK POST DISCHARGE FOLLOW UP NOTE - NSBHSWFOLLOWUP_PSY_ALL_CORE_FT
Writer was advised that pt did not comply with her appt on 6/12 at [facility].  Called pt to further discuss () and l.m. requesting c/b.  Will try again. Writer was advised that pt did not comply with her appt on 6/12 at [facility].  Called pt to further discuss () and lmarily. requesting c/b.  Will try again.    7/3:  Writer called pt again to further discuss her missed appt at [facility] on 6/12.  Spoke with pt who stated that she "forgot" about the appt and no longer wants to go to that clinic as she feels disappointed that they did not call her to remind her.  Writer provided understanding however advised that clinics do not usually call pts to remind them of intake and that this was reviewed with this writer on day of d/c.  Pt requested a list of other clinics close to her home that she reported plan to call to re-engage in tx.  Writer emailed list to [email or web address] and encouraged pt to call this writer with any other questions or concerns.  Case is now closed.

## 2023-12-29 NOTE — BH INPATIENT PSYCHIATRY PROGRESS NOTE - NSBHASSESSSUMMFT_PSY_ALL_CORE
57 y/o single,  female, has 2 adult sons, domicile , with no significant medical hx, no formal psychiatric hx, has remote hx of sa via OD  at age 14 , stayed in the hospital one day, no other treatment and currently not in treatment. Patient denies any hx of NSSIB. she denies any hx of substance use, aggression or violence . Pt bib son for depression and si.     on assessment, patient improving, less depressed, no longer with noted paranoid ideations,  tolerating medications, denied SI/I/P    Treatment Plan  1. admitted to unit, legal status 9.13  2. safety  - routine checks as pt denies SIIP/HIIP and is able to contract for safety  - haldol and ativan PO/IM PRN for agitation and anxiety  3. psychiatric  - continue with sertraline 100mg daily for MDD  - continue with olanzapine 5mg QHS for MDD with psychotic features - improved  4. medical  - pt denies  5. encourage I/G/M therapy  6. SW to pursue discharge planning, coordinate care with family Fall with Harm Risk

## 2024-01-16 NOTE — BH PSYCHOLOGY - CLINICIAN PSYCHOTHERAPY NOTE - TOKEN PULL-DIAGNOSIS
1/16/2024    Santa ENDER Michael  [address]  [place] [place] [id number]      Dear Santa,    There’s no question about it - preventive care can save lives. Many health problems start out silently without symptoms. Preventive care is often the only way to catch these problems in early stages, when they can be more successfully treated.     Our records show that you are due for the screening(s) listed below. If you have completed these screening(s), please call us so we can update your record.    Screening Pap  Pap Test: Cervical cancer is usually slow growing. It is preventable and it can be cured if found early. The Pap test is the most effective cancer screening test. To assist you in scheduling with a provider, please call [phone].    Your health is important to us. Please feel free to call my office at [phone] or make an appointment to discuss the best screening options for you.     Sincerely,      Niki Quinn MD   [facility] [address] [address]  Dept: [phone]  Dept Fax: [phone]     Enclosures:    Why Have a Pap Test?  A pap test looks for early signs of cancer in your cervix. Early changes in cervical cells don't cause symptoms. Often the only way to find them is with a Pap test. A Pap test can find these problems early, when they are easier to treat. Pap tests can also find some infections of the cervix and vagina.   What is a Pap test?  A Pap test is a procedure that helps find changes in the cervix that may lead to cancer. The cervix is the part of the uterus that opens into the vagina. For this test, a small sample of cells is taken from the cervix. This is done in your healthcare provider’s office. The cells are then examined in a lab. A Pap test is a safe procedure. It takes just a few minutes and causes little or no discomfort.   The HPV connection  Human papillomavirus (HPV) is a group of viruses  that spread through skin contact and sex. Some types cause cell changes (dysplasia) in the cervix that can lead to cancer. In fact, HPV infection is the biggest risk factor for cervical cancer. Healthcare providers can now test for HPV. Testing for HPV is often done with the Pap test. That’s why it’s important to have Pap tests as advised by your healthcare provider. This helps ensure that any abnormal cells will be found. They can be treated before they become cancer.   Who should have a Pap test and HPV test?  Ask your healthcare provider:    When to start having Pap tests  If you should have an HPV test at the same time  How often to have tests    The American Cancer Society advises:   Have your first Pap test at age 21, and then every 3 years until age 29. HPV testing is not advised during this time. But it may be done to follow up on an abnormal Pap test.  Starting at age 30, have a Pap test with an HPV test every 5 years. This should be done until age 65. Another option for people age 30 to 65 is just the Pap test every 3 years.  You may need a different test schedule if you are at high risk for cervical cancer. Risk factors include having HIV, a weak immune system, or exposure to the medicine JOHNNY while your mother was pregnant with you. Talk with your provider about the best schedule for you.  If you’re over 65, had regular tests for the last 10 years, and no abnormal results in the last 20 years, you may stop the tests.  If you had a hysterectomy that included removing your cervix, you can stop tests unless it was done to treat cervical cancer or precancer. If you still have your cervix, you should have tests in line with the above guidelines.  Routine tests don't need to be done each year. But if a test is abnormal, your provider will tell you how often to be tested.   People who have had the HPV vaccine should still follow these guidelines.  If you had cervical cancer, talk with your provider about the  test plan that's best for you.  Christy last reviewed this educational content on 6/1/2020  © 2193-1383 [employer]. All rights reserved. This information is not intended as a substitute for professional medical care. Always follow your healthcare professional's instructions.        [facility] offers online access to your health information via [email or web address]        Primary Diagnosis:  MDD (major depressive disorder), single episode, severe with psychotic features [F32.3]        Problem Dx:   MDD (major depressive disorder), single episode, severe with psychotic features [F32.3]

## 2024-01-25 ENCOUNTER — OUTPATIENT (OUTPATIENT)
Dept: OUTPATIENT SERVICES | Facility: HOSPITAL | Age: 59
LOS: 1 days | Discharge: STILL ADMITTED OR ADMINISTRATIVE | End: 2024-01-25
Payer: MEDICAID

## 2024-01-25 ENCOUNTER — INPATIENT (INPATIENT)
Facility: HOSPITAL | Age: 59
LOS: 18 days | Discharge: HOME | End: 2024-02-13
Attending: PSYCHIATRY & NEUROLOGY | Admitting: PSYCHIATRY & NEUROLOGY
Payer: MEDICAID

## 2024-01-25 VITALS — TEMPERATURE: 98 F | WEIGHT: 153 LBS | HEIGHT: 62 IN

## 2024-01-25 DIAGNOSIS — F32.9 MAJOR DEPRESSIVE DISORDER, SINGLE EPISODE, UNSPECIFIED: ICD-10-CM

## 2024-01-25 PROCEDURE — 99222 1ST HOSP IP/OBS MODERATE 55: CPT

## 2024-01-25 RX ORDER — GABAPENTIN 400 MG/1
300 CAPSULE ORAL
Refills: 0 | Status: DISCONTINUED | OUTPATIENT
Start: 2024-01-25 | End: 2024-02-13

## 2024-01-25 RX ORDER — PALIPERIDONE 1.5 MG/1
3 TABLET, EXTENDED RELEASE ORAL AT BEDTIME
Refills: 0 | Status: DISCONTINUED | OUTPATIENT
Start: 2024-01-25 | End: 2024-01-29

## 2024-01-25 RX ORDER — OLANZAPINE 15 MG/1
5 TABLET, FILM COATED ORAL EVERY 4 HOURS
Refills: 0 | Status: DISCONTINUED | OUTPATIENT
Start: 2024-01-25 | End: 2024-02-13

## 2024-01-25 RX ORDER — OLANZAPINE 15 MG/1
5 TABLET, FILM COATED ORAL ONCE
Refills: 0 | Status: DISCONTINUED | OUTPATIENT
Start: 2024-01-25 | End: 2024-02-13

## 2024-01-25 RX ADMIN — PALIPERIDONE 3 MILLIGRAM(S): 1.5 TABLET, EXTENDED RELEASE ORAL at 21:22

## 2024-01-25 NOTE — BH INPATIENT PSYCHIATRY ASSESSMENT NOTE - CURRENT MEDICATION
MEDICATIONS  (STANDING):  paliperidone ER 3 milliGRAM(s) Oral at bedtime    MEDICATIONS  (PRN):  gabapentin 300 milliGRAM(s) Oral four times a day PRN anxiety  OLANZapine 5 milliGRAM(s) Oral every 4 hours PRN agitation or aggression  OLANZapine Injectable 5 milliGRAM(s) IntraMuscular once PRN agitation or aggression

## 2024-01-25 NOTE — BH INPATIENT PSYCHIATRY ASSESSMENT NOTE - NSBHMETABOLIC_PSY_ALL_CORE_FT
BMI: BMI (kg/m2): 28 (01-25-24 @ 19:01)  HbA1c: A1C with Estimated Average Glucose Result: 5.3 % (06-02-23 @ 08:57)    Glucose:   BP: --Vital Signs Last 24 Hrs  T(C): 36.6 (01-25-24 @ 19:01), Max: 36.6 (01-25-24 @ 19:01)  T(F): 97.9 (01-25-24 @ 19:01), Max: 97.9 (01-25-24 @ 19:01)  HR: --  BP: --  BP(mean): --  RR: --  SpO2: --    Orthostatic VS  01-25-24 @ 19:01  Lying BP: --/-- HR: --  Sitting BP: 135/84 HR: 94  Standing BP: 139/86 HR: 108  Site: --  Mode: --    Lipid Panel: Date/Time: 06-02-23 @ 08:57  Cholesterol, Serum: 201  LDL Cholesterol Calculated: 112  HDL Cholesterol, Serum: 62  Total Cholesterol/HDL Ration Measurement: --  Triglycerides, Serum: 137

## 2024-01-25 NOTE — BH INPATIENT PSYCHIATRY ASSESSMENT NOTE - RISK ASSESSMENT
RISK:  - Modifiable risk factors: Depression with psychosis   - Unmodifiable risk factors: female gender, age, 1 prior admission, hx of treatment/medication nonadherance  - Protective factors: treatment seeking, supportive network   - Given above, the patient is clinically appropriate voluntary admission

## 2024-01-25 NOTE — BH PATIENT PROFILE - HOME MEDICATIONS
OLANZapine 5 mg oral tablet , 1 tab(s) orally once a day (at bedtime)  melatonin 3 mg oral tablet , 1 tab(s) orally once a day (at bedtime) as needed for Insomnia  sertraline 100 mg oral tablet , 1 tab(s) orally once a day

## 2024-01-25 NOTE — BH INPATIENT PSYCHIATRY ASSESSMENT NOTE - NSBHASSESSSUMMFT_PSY_ALL_CORE
ASSESSMENT: Patient is a 58-year-old female, single, domiciled with son, has 2 adult son, employed house cleaning and [employer] , PMH: hx of knee surgery, dx with depression with psychosis, 1 prior admission at [facility] from 5/26 to 6/6/23 for SI and depression, 2 prior SA – 1st age [age]yo by OD and was held overnight at a hospital, most recent in May 2023  where she attempted to cut her wrist and took ½ bottle of NyQuil prior to her admission. She was stabilized on Olanzapine 5mg and Sertraline 100mg daily but did not follow up with treatment after discharge (referred to [facility]). She has been taking d/c medication sporadically and last took Olanzapine 5mg 2m ago and sertraline 100mg 2 weeks ago. She recently started treatment at [facility] for psychotherapy with plans for weekly or 2x week visits, denies substance use, denies hx of violence or aggression, no legal hx, no access to firearms, presents to [facility] seeking assessment for worsened symptoms of depression and psychosis. At the time of assessment, clinical picture is significant for depression with psychosis. She presents as anxious and distressed with ongoing paranoia and SI to OD. She is unable to safety plan in the community and clinically appropriate for admission. Agrees to VOL admit, and to work with day team, also trial of invega. Defer on all ssris/snris for now given unclear dx. Goal of VILLANUEVA for safety and compliance. R/o BAD, r/o SAD, r/o SCZ    PLAN  >would benefit from 9.13 vol admit  >q15 adequate at this time, CO not warranted in structured milieu  >Psych meds:  Start invega 3 mg qhs  Goal of VILLANUEVA for safety and compliance given hx of noncommpliance and suicide attempts with poor insight  Consider start of lithium  Defer on all ssris/snris as likely not needed, r/o BAD, r/o SAD  >PRNs:  Zyprexa 5 IM and PO, neurontin 300 QID PRN  Comorbids: overweight, none other known  NUT: BMI/height and weights, NUT consult  Family collateral  Psych collateral  G&M therapy  Dispo TBD-- consider PHP

## 2024-01-25 NOTE — BH INPATIENT PSYCHIATRY ASSESSMENT NOTE - VIOLENCE RISK FACTORS:
Feeling of being under threat and being unable to control threat/Affective dysregulation/Impulsivity/Noncompliance with treatment/Irritability

## 2024-01-25 NOTE — BH INPATIENT PSYCHIATRY ASSESSMENT NOTE - HPI (INCLUDE ILLNESS QUALITY, SEVERITY, DURATION, TIMING, CONTEXT, MODIFYING FACTORS, ASSOCIATED SIGNS AND SYMPTOMS)
Patient seen and evaluated in  [facility] with Eryn Finnegan LMSW. We discussed the case, and I met with and personally examined the patient. I reviewed all pertinent clinical information including the patient’s physical health and mental status. I was directly involved in the management plan and recommendations of care provided to the patient. Below is a summary of our combined findings. Total time excludes SW assessment.    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 She was stabilized on Olanzapine 5mg and Sertraline 100mg daily but did not follow up with treatment after discharge (referred to [facility]). She has been taking d/c medication sporadically and last took Olanzapine 5mg 2m ago and sertraline 100mg 2 weeks ago. She recently started treatment at [facility] for psychotherapy with plans for weekly or 2x week visits, denies substance use, denies hx of violence or aggression, no legal hx, no access to firearms, presents to [facility] seeking assessment for worsened symptoms of depression and psychosis.     She reports worsening symptom of depression over the last week with sad and depression mood, poor sleep with difficulties falling and staying asleep, and averages 4 hours at night. She describes “life” as a general stressor. She reports poor appetite and averages 1 meal day. She suspects weight loss. She reports anhedonia, denies feeling of guilt or low selflessness, denies feeling of worthlessness. She reports periods of crying, denies memory or concentration changes. she reports anxiety where she feels overwhelmed, jittery, racing thought and rapid heartbeat. She reports excessive fears of everyone and everything and worries. She denies AVH, paranoia, symptoms of magdy. 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 She reports SI at times without identified plan or intent. She denies SI at present.    Collateral from her son Forrest [phone]: She gets in episode where she says she is a lizard another time said she is a lion. Reports pt making comments that her son is not her son.     ON INTERVIEW WITH ME:  Patient confirms above, with the following corrections/emphases: Patient seen with her son Forrest at her request. She presents with symptoms of depression, anxiety, psychosis, and SI. She has not followed up with treatment since her d/c in 6/2023. She has been taking medication sporadically when feeling anxious of overwhelmed. Symptoms worsened 1 week ago without any specific trigger. She describes feeling more anxious, overwhelmed, depressed with poor sleep, and averaging 4 hours at night. Her appetite is poor with 6lb weight loss in one week. She reports poor focus and concentration, anhedonia, and low motivation. She reports paranoia that people are going to kill or rape her. She has been calling out of work all week and describes feeling disorganized and confused. During assessment asking if her son or her sister are going to Kill her. During assessment, she was noted to be looking at her son, reports she is unsure if he is her son and wonders if he is going to kill or rape her. She reports SI starting 1 week ago with thoughts “take something.” She endorses access to means including pills at home. She denies current plan or intent but unable to guarantee her safety in the community, reports “I don’t know if I can be safe.” She appears anxious and distressed. She denies HI, AVH, no hx of hannah.     Per collateral from her son Forrest. He went to pick her up for a scheduled colonoscopy today. She started saying she doesn’t want to be alive, none of this is real, didn’t think he was her son, saying people are going to kill and rape her. She would refer to herself as a lizard or a lion, prompting [facility] visit.  He advocated for admission.    She connected with Therapist at [facility] for 1 session who is connecting her to psychiatry.     On 2W, pt seen by [facility] attending soon after arrival from [facility]. Appears somewhat guarded and paranoid, but did meet with MD. Answers questions in limited manner, revealing little unless MD corroborated info from NP in CC first, then asked questions about specifics. Does endorse still believing her son is not her actual son, but not at all times. Feels mood is down, says main problem is "racing thoughts" yet only had such one week ago. Then tells MD source of stress in her life, as all persons has to do with how we handle life, then adds her problem is "a lack of planning." But pt does not clarify any of these details, with prominent guardedness. Denies wish to harm self on unit, no CO warranted at this time. Says she exercises, has no health problems, and eats healthy-- noted to be eating a chicken and corn salad while meeting with MD. Agrees to work with day team. Did endorse past zyprexa Rx, also with weight gain, but agrees to trial of invega, and pleased it could help with racing thoughts, reports no hx of lithium trial.

## 2024-01-25 NOTE — BH INPATIENT PSYCHIATRY ASSESSMENT NOTE - NSBHCHARTREVIEWVS_PSY_A_CORE FT
Vital Signs Last 24 Hrs  T(C): 36.6 (01-25-24 @ 19:01), Max: 36.6 (01-25-24 @ 19:01)  T(F): 97.9 (01-25-24 @ 19:01), Max: 97.9 (01-25-24 @ 19:01)  HR: --  BP: --  BP(mean): --  RR: --  SpO2: --    Orthostatic VS  01-25-24 @ 19:01  Lying BP: --/-- HR: --  Sitting BP: 135/84 HR: 94  Standing BP: 139/86 HR: 108  Site: --  Mode: --

## 2024-01-25 NOTE — CHART NOTE - NSCHARTNOTEFT_GEN_A_CORE
Screening Medical Evaluation    Patient Admitted from: Crisis center    [facility] admitting diagnosis: Major depressive disorder with single episode        PAST MEDICAL & SURGICAL HISTORY:        Allergies    No Known Allergies    Intolerances          Social History:       FAMILY HISTORY:        MEDICATIONS  (STANDING):  paliperidone ER 3 milliGRAM(s) Oral at bedtime    MEDICATIONS  (PRN):  gabapentin 300 milliGRAM(s) Oral four times a day PRN anxiety  OLANZapine 5 milliGRAM(s) Oral every 4 hours PRN agitation or aggression  OLANZapine Injectable 5 milliGRAM(s) IntraMuscular once PRN agitation or aggression        Vital Signs Last 24 Hrs  T(C): 36.6 (25 Jan 2024 19:01), Max: 36.6 (25 Jan 2024 19:01)  T(F): 97.9 (25 Jan 2024 19:01), Max: 97.9 (25 Jan 2024 19:01)  HR: -- 94b/ min.   BP: -- 135/ 84  BP(mean): --  RR: -- 16b/ min  SpO2: --      CAPILLARY BLOOD GLUCOSE            PHYSICAL EXAM:  GENERAL: NAD.   HEAD:  Atraumatic, Normocephalic  EYES: EOMI, PERRLA, conjunctiva and sclera clear  NECK: Supple, No JVD  CHEST/LUNG: Clear to auscultation bilaterally; No wheeze  HEART: Regular rate and rhythm; No murmurs, rubs, or gallops  ABDOMEN: Positive BS, Soft, Nontender.   EXTREMITIES:  2+ Peripheral Pulses, No clubbing, cyanosis, or edema  PSYCH: calm and cooperative   NEUROLOGY: non-focal  SKIN: No rashes or lesions seen on exposed skin.     LABS:                    RADIOLOGY & ADDITIONAL TESTS:      Assessment and Plan:  58F admitted to [facility] for Major depressive disorder with single episode. No PMHx.  Pt seen for medical screening evaluation. Patient has no acute complaints at this time. Patient denies fever, chills, headache, dizziness, lightheadedness, N/V, SOB, cough, congestion, chest pain, abdominal pain, dysuria, hematuria, diarrhea, constipation. Physical exam unremarkable, VSS. Labs pending. EKG pending.     1.) Major depressive disorder with single episode: F/U EKG to assess QT/ QTC. Plan per primary team,

## 2024-01-25 NOTE — BH PATIENT PROFILE - FALL HARM RISK - UNIVERSAL INTERVENTIONS
Attempted to confirm appointment with patient without success.  No answer from patient.  Busy signal each time.  SLAVA     Instruct patient to call for assistance before getting out of bed or chair/Non-slip footwear when patient is out of bed/Physically safe environment - no spills, clutter or unnecessary equipment/Purposeful Proactive Rounding/Room/bathroom lighting operational, light cord in reach

## 2024-01-26 LAB
A1C WITH ESTIMATED AVERAGE GLUCOSE RESULT: 5.7 % — HIGH (ref 4–5.6)
ALBUMIN SERPL ELPH-MCNC: 4.6 G/DL — SIGNIFICANT CHANGE UP (ref 3.3–5)
ALP SERPL-CCNC: 91 U/L — SIGNIFICANT CHANGE UP (ref 40–120)
ALT FLD-CCNC: 23 U/L — SIGNIFICANT CHANGE UP (ref 4–33)
ANION GAP SERPL CALC-SCNC: 12 MMOL/L — SIGNIFICANT CHANGE UP (ref 7–14)
AST SERPL-CCNC: 21 U/L — SIGNIFICANT CHANGE UP (ref 4–32)
BASOPHILS # BLD AUTO: 0.04 K/UL — SIGNIFICANT CHANGE UP (ref 0–0.2)
BASOPHILS NFR BLD AUTO: 0.7 % — SIGNIFICANT CHANGE UP (ref 0–2)
BILIRUB SERPL-MCNC: 0.5 MG/DL — SIGNIFICANT CHANGE UP (ref 0.2–1.2)
BUN SERPL-MCNC: 14 MG/DL — SIGNIFICANT CHANGE UP (ref 7–23)
CALCIUM SERPL-MCNC: 9.2 MG/DL — SIGNIFICANT CHANGE UP (ref 8.4–10.5)
CHLORIDE SERPL-SCNC: 101 MMOL/L — SIGNIFICANT CHANGE UP (ref 98–107)
CHOLEST SERPL-MCNC: 209 MG/DL — HIGH
CO2 SERPL-SCNC: 26 MMOL/L — SIGNIFICANT CHANGE UP (ref 22–31)
CREAT SERPL-MCNC: 0.6 MG/DL — SIGNIFICANT CHANGE UP (ref 0.5–1.3)
EGFR: 103 ML/MIN/1.73M2 — SIGNIFICANT CHANGE UP
EOSINOPHIL # BLD AUTO: 0.01 K/UL — SIGNIFICANT CHANGE UP (ref 0–0.5)
EOSINOPHIL NFR BLD AUTO: 0.2 % — SIGNIFICANT CHANGE UP (ref 0–6)
ESTIMATED AVERAGE GLUCOSE: 117 — SIGNIFICANT CHANGE UP
GLUCOSE SERPL-MCNC: 193 MG/DL — HIGH (ref 70–99)
HCT VFR BLD CALC: 40 % — SIGNIFICANT CHANGE UP (ref 34.5–45)
HDLC SERPL-MCNC: 79 MG/DL — SIGNIFICANT CHANGE UP
HGB BLD-MCNC: 13.2 G/DL — SIGNIFICANT CHANGE UP (ref 11.5–15.5)
IANC: 4.11 K/UL — SIGNIFICANT CHANGE UP (ref 1.8–7.4)
IMM GRANULOCYTES NFR BLD AUTO: 0.3 % — SIGNIFICANT CHANGE UP (ref 0–0.9)
LIPID PNL WITH DIRECT LDL SERPL: 120 MG/DL — HIGH
LYMPHOCYTES # BLD AUTO: 1.34 K/UL — SIGNIFICANT CHANGE UP (ref 1–3.3)
LYMPHOCYTES # BLD AUTO: 22.1 % — SIGNIFICANT CHANGE UP (ref 13–44)
MCHC RBC-ENTMCNC: 28.1 PG — SIGNIFICANT CHANGE UP (ref 27–34)
MCHC RBC-ENTMCNC: 33 GM/DL — SIGNIFICANT CHANGE UP (ref 32–36)
MCV RBC AUTO: 85.1 FL — SIGNIFICANT CHANGE UP (ref 80–100)
MONOCYTES # BLD AUTO: 0.55 K/UL — SIGNIFICANT CHANGE UP (ref 0–0.9)
MONOCYTES NFR BLD AUTO: 9.1 % — SIGNIFICANT CHANGE UP (ref 2–14)
NEUTROPHILS # BLD AUTO: 4.11 K/UL — SIGNIFICANT CHANGE UP (ref 1.8–7.4)
NEUTROPHILS NFR BLD AUTO: 67.6 % — SIGNIFICANT CHANGE UP (ref 43–77)
NON HDL CHOLESTEROL: 130 MG/DL — HIGH
NRBC # BLD: 0 /100 WBCS — SIGNIFICANT CHANGE UP (ref 0–0)
NRBC # FLD: 0 K/UL — SIGNIFICANT CHANGE UP (ref 0–0)
PLATELET # BLD AUTO: 324 K/UL — SIGNIFICANT CHANGE UP (ref 150–400)
POTASSIUM SERPL-MCNC: 3.5 MMOL/L — SIGNIFICANT CHANGE UP (ref 3.5–5.3)
POTASSIUM SERPL-SCNC: 3.5 MMOL/L — SIGNIFICANT CHANGE UP (ref 3.5–5.3)
PROT SERPL-MCNC: 8.1 G/DL — SIGNIFICANT CHANGE UP (ref 6–8.3)
RBC # BLD: 4.7 M/UL — SIGNIFICANT CHANGE UP (ref 3.8–5.2)
RBC # FLD: 12.2 % — SIGNIFICANT CHANGE UP (ref 10.3–14.5)
SODIUM SERPL-SCNC: 139 MMOL/L — SIGNIFICANT CHANGE UP (ref 135–145)
TRIGL SERPL-MCNC: 51 MG/DL — SIGNIFICANT CHANGE UP
TSH SERPL-MCNC: 1.74 UIU/ML — SIGNIFICANT CHANGE UP (ref 0.27–4.2)
VIT B12 SERPL-MCNC: 685 PG/ML — SIGNIFICANT CHANGE UP (ref 200–900)
VIT D25+D1,25 OH+D1,25 PNL SERPL-MCNC: 108 PG/ML — HIGH (ref 19.9–79.3)
WBC # BLD: 6.07 K/UL — SIGNIFICANT CHANGE UP (ref 3.8–10.5)
WBC # FLD AUTO: 6.07 K/UL — SIGNIFICANT CHANGE UP (ref 3.8–10.5)

## 2024-01-26 PROCEDURE — 99232 SBSQ HOSP IP/OBS MODERATE 35: CPT

## 2024-01-26 RX ORDER — SERTRALINE 25 MG/1
25 TABLET, FILM COATED ORAL ONCE
Refills: 0 | Status: COMPLETED | OUTPATIENT
Start: 2024-01-26 | End: 2024-01-26

## 2024-01-26 RX ORDER — SERTRALINE 25 MG/1
25 TABLET, FILM COATED ORAL DAILY
Refills: 0 | Status: DISCONTINUED | OUTPATIENT
Start: 2024-01-26 | End: 2024-01-29

## 2024-01-26 RX ADMIN — SERTRALINE 25 MILLIGRAM(S): 25 TABLET, FILM COATED ORAL at 17:46

## 2024-01-26 RX ADMIN — GABAPENTIN 300 MILLIGRAM(S): 400 CAPSULE ORAL at 17:46

## 2024-01-26 RX ADMIN — PALIPERIDONE 3 MILLIGRAM(S): 1.5 TABLET, EXTENDED RELEASE ORAL at 21:30

## 2024-01-26 RX ADMIN — OLANZAPINE 5 MILLIGRAM(S): 15 TABLET, FILM COATED ORAL at 17:46

## 2024-01-26 NOTE — BH INPATIENT PSYCHIATRY PROGRESS NOTE - ATTENDING COMMENTS
Care was discussed and reviewed in interdisciplinary treatment team.  I, Brittni Pennington MD, have reviewed and verified the documentation.  I independently performed the documented medical decision making.    Patient was seen and evaluated today. She presents as guarded and suspicious. Admitted that she has been admitted due to depression and psychosis. She is willing to have her medications adjusted. Patient tells me that she has not been complaint with her treatment at home.

## 2024-01-26 NOTE — BH INPATIENT PSYCHIATRY PROGRESS NOTE - CURRENT MEDICATION
MEDICATIONS  (STANDING):  paliperidone ER 3 milliGRAM(s) Oral at bedtime    MEDICATIONS  (PRN):  gabapentin 300 milliGRAM(s) Oral four times a day PRN anxiety  OLANZapine 5 milliGRAM(s) Oral every 4 hours PRN agitation or aggression  OLANZapine Injectable 5 milliGRAM(s) IntraMuscular once PRN agitation or aggression   MEDICATIONS  (STANDING):  paliperidone ER 3 milliGRAM(s) Oral at bedtime  sertraline 25 milliGRAM(s) Oral daily    MEDICATIONS  (PRN):  gabapentin 300 milliGRAM(s) Oral four times a day PRN anxiety  OLANZapine 5 milliGRAM(s) Oral every 4 hours PRN agitation or aggression  OLANZapine Injectable 5 milliGRAM(s) IntraMuscular once PRN agitation or aggression

## 2024-01-26 NOTE — BH SOCIAL WORK INITIAL PSYCHOSOCIAL EVALUATION - OTHER PAST PSYCHIATRIC HISTORY (INCLUDE DETAILS REGARDING ONSET, COURSE OF ILLNESS, INPATIENT/OUTPATIENT TREATMENT)
Patient is a 58-year-old female, single, domiciled with son, has 2 adult sons, employed house cleaning and [employer] , [facility]: hx of knee surgery, dx with depression with psychosis, 1 prior admission at [facility] 2W from 5/26 to 6/6/23 for SI and depression, 2 prior SA – 1st age [age]yo by OD and was held overnight at a hospital, most recent in May 2023  where she attempted to cut her wrist and took ½ bottle of NyQuil prior to her admission. She was stabilized on Olanzapine 5mg and Sertraline 100mg daily but did not follow up with treatment after discharge ([facility] by this SW). She has been taking d/c medication sporadically and last took Olanzapine 5mg 2m ago and sertraline 100mg 2 weeks ago. She recently started treatment at [facility] for psychotherapy with plans for weekly or 2x week visits, denies substance use, denies hx of violence or aggression, no legal hx, no access to firearms, presents to [facility] seeking assessment for worsened symptoms of depression and psychosis.     She reports worsening symptom of depression over the last week with sad and depression mood, poor sleep with difficulties falling and staying asleep, and averages 4 hours at night. She describes “life” as a general stressor. She reports poor appetite and averages 1 meal day. She suspects weight loss. She reports anhedonia, denies feeling of guilt or low selflessness, denies feeling of worthlessness. She reports periods of crying, denies memory or concentration changes. she reports anxiety where she feels overwhelmed, jittery, racing thought and rapid heartbeat. She reports excessive fears of everyone and everything and worries. She denies AVH, paranoia, symptoms of magdy. 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 She reports SI at times without identified plan or intent. She denies SI at present.    Collateral from her son Forrest in ED - She gets in episodes where she says she is a lizard another time said she is a lion. Reports pt making comments that her son is not her son. He went to pick her up for a scheduled colonoscopy but she started saying she doesn’t want to be alive, none of this is real, didn’t think he was her son, saying people are going to kill and rape her. She would refer to herself as a lizard or a lion, prompting [facility] visit.  He advocated for admission.    Pt presents with symptoms of depression, anxiety, psychosis, and SI. She has not followed up with treatment since her d/c in 6/2023. She has been taking medication sporadically when feeling anxious of overwhelmed. Symptoms worsened 1 week ago without any specific trigger. She describes feeling more anxious, overwhelmed, depressed with poor sleep, and averaging 4 hours at night. Her appetite is poor with 6lb weight loss in one week. She reports poor focus and concentration, anhedonia, and low motivation. She reports paranoia that people are going to kill or rape her. She has been calling out of work all week and describes feeling disorganized and confused. She was asking if her son or her sister are going to Kill her and was noted to be looking at her son, reports she is unsure if he is her son and wonders if he is going to kill or rape her. She reports SI starting 1 week ago with thoughts “take something.” She endorses access to means including pills at home. She denies current plan or intent but unable to guarantee her safety in the community, reports “I don’t know if I can be safe.” She appears anxious and distressed. She denies HI, AVH, no hx of hannah.     She connected with Therapist at [facility] for 1 session who is connecting her to psychiatry.     While on unit, pt stated that her mood is down, says main problem is "racing thoughts" yet only had such one week ago.  Says she exercises, has no health problems, and eats healthy and agrees to work with team. Did endorse past zyprexa Rx, also with weight gain, but agrees to trial of invega, and pleased it could help with racing thoughts.

## 2024-01-26 NOTE — BH INPATIENT PSYCHIATRY PROGRESS NOTE - NSBHCHARTREVIEWVS_PSY_A_CORE FT
Vital Signs Last 24 Hrs  T(C): 36.8 (01-26-24 @ 07:31), Max: 36.8 (01-26-24 @ 07:31)  T(F): 98.3 (01-26-24 @ 07:31), Max: 98.3 (01-26-24 @ 07:31)  HR: --  BP: --  BP(mean): --  RR: 19 (01-26-24 @ 07:31) (19 - 19)  SpO2: 99% (01-26-24 @ 07:31) (99% - 99%)    Orthostatic VS  01-26-24 @ 07:31  Lying BP: --/-- HR: --  Sitting BP: 148/83 HR: 99  Standing BP: 153/87 HR: 106  Site: --  Mode: --  Orthostatic VS  01-25-24 @ 19:01  Lying BP: --/-- HR: --  Sitting BP: 135/84 HR: 94  Standing BP: 139/86 HR: 108  Site: --  Mode: --   Vital Signs Last 24 Hrs  T(C): 36.4 (01-27-24 @ 07:54), Max: 36.4 (01-27-24 @ 07:54)  T(F): 97.6 (01-27-24 @ 07:54), Max: 97.6 (01-27-24 @ 07:54)  HR: --  BP: --  BP(mean): --  RR: 19 (01-27-24 @ 07:54) (19 - 19)  SpO2: --    Orthostatic VS  01-27-24 @ 07:54  Lying BP: --/-- HR: --  Sitting BP: 134/84 HR: 104  Standing BP: 125/77 HR: 108  Site: --  Mode: --  Orthostatic VS  01-26-24 @ 07:31  Lying BP: --/-- HR: --  Sitting BP: 148/83 HR: 99  Standing BP: 153/87 HR: 106  Site: --  Mode: --  Orthostatic VS  01-25-24 @ 19:01  Lying BP: --/-- HR: --  Sitting BP: 135/84 HR: 94  Standing BP: 139/86 HR: 108  Site: --  Mode: --

## 2024-01-26 NOTE — BH INPATIENT PSYCHIATRY PROGRESS NOTE - NSBHASSESSSUMMFT_PSY_ALL_CORE
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 She was stabilized on Olanzapine 5mg and Sertraline 100mg daily but did not follow up with treatment after discharge (referred to [facility]). She has been taking d/c medication sporadically and last took Olanzapine 5mg 2m ago and sertraline 100mg 2 weeks ago. She recently started treatment at [facility] for psychotherapy with plans for weekly or 2x week visits, denies substance use, denies hx of violence or aggression, no legal hx, no access to firearms, presents to [facility] seeking assessment for worsened symptoms of depression and psychosis. At the time of assessment, clinical picture is significant for depression with psychosis. She presents as anxious and distressed with ongoing paranoia and SI to OD. She is unable to safety plan in the community and clinically appropriate for admission. Agrees to VOL admit, and to work with day team, also trial of invega. Defer on all ssris/snris for now given unclear dx. Goal of VILLANUEVA for safety and compliance. R/o BAD, r/o SAD, r/o SCZ    PLAN  >would benefit from 9.13 vol admit  >q15 adequate at this time, CO not warranted in structured milieu  >Psych meds:  Start invega 3 mg qhs  Goal of VILLANUEVA for safety and compliance given hx of noncommpliance and suicide attempts with poor insight  Consider start of lithium  Defer on all ssris/snris as likely not needed, r/o BAD, r/o SAD  >PRNs:  Zyprexa 5 IM and PO, neurontin 300 QID PRN  Comorbids: overweight, none other known  NUT: BMI/height and weights, NUT consult  Family collateral  Psych collateral  G&M therapy  Dispo TBD-- consider PHP ASSESSMENT: Patient is a 58-year-old female, single, domiciled with son, has 2 adult son, 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 She was stabilized on Olanzapine 5mg and Sertraline 100mg daily but did not follow up with treatment after discharge (referred to [facility]). She has been taking d/c medication sporadically and last took Olanzapine 5mg 2m ago and sertraline 100mg 2 weeks ago. She recently started treatment at [facility] for psychotherapy with plans for weekly or 2x week visits, denies substance use, denies hx of violence or aggression, no legal hx, no access to firearms, presents to [facility] seeking assessment for worsened symptoms of depression and psychosis. At the time of assessment, clinical picture is significant for depression with psychosis. She presents as anxious and distressed with ongoing paranoia and SI to OD. She is unable to safety plan in the community and clinically appropriate for admission. Agrees to VOL admit, and to work with day team, also trial of invega. Defer on all ssris/snris for now given unclear dx. Goal of VILLANUEVA for safety and compliance. R/o BAD, r/o SAD, r/o SCZ    PLAN  >would benefit from 9.13 vol admit  >q15 adequate at this time, CO not warranted in structured milieu  >Psych meds:  Start invega 3 mg qhs  Goal of VILLANUEVA for safety and compliance given hx of noncommpliance and suicide attempts with poor insight  Consider start of lithium  Started Zoloft 25mg daily  >PRNs:  Zyprexa 5 IM and PO, neurontin 300 QID PRN  Comorbids: overweight, none other known  NUT: BMI/height and weights, NUT consult  Family collateral  Psych collateral  G&M therapy  Dispo TBD-- consider PHP

## 2024-01-26 NOTE — BH SOCIAL WORK INITIAL PSYCHOSOCIAL EVALUATION - NSPROPTRIGHTCAREGIVER_GEN_A_NUR
Abdomen , soft, nontender, nondistended , no guarding or rigidity , no masses palpable , normal bowel sounds , Liver and Spleen , no hepatomegaly present  , liver nontender , 
yes

## 2024-01-26 NOTE — BH INPATIENT PSYCHIATRY PROGRESS NOTE - NSBHFUPINTERVALHXFT_PSY_A_CORE
Patient was seen for f/u for Depression and psychosis. Chart reviewed and case discussed in team meeting. Patient reported feeling "anxious, depressed and suicidal", she was ambivalent if SI was active or passive, "If I had the means I would", patient was able to contract for safety and agreed to seek nursing for help. Patient accepted PRN Ativan and Zyprexa. Patient was hopeless with flas affect, and +PMR. Patient reported feeling paranoid, hearing voices, disorganized, getting messaged from the TV. Patient endorsed no compliance to medications prior to admission, and not engaging in treatment. Patient reported Zoloft was helpful when she was adherent with medication, agreed to restarted, ordered 25mg once today and to start daily tomorrow.

## 2024-01-26 NOTE — PSYCHIATRIC REHAB INITIAL EVALUATION - NSBHPRRECOMMEND_PSY_ALL_CORE
The writer met with the patient to orient her to psychiatric rehabilitation staff and services. Per the patient’s chart, the patient self-presented to [facility] seeking assessment for worsened symptoms of depression and psychosis. The writer established a psychiatric rehabilitation goal for the patient to work on over the next seven days. The patient’s psychiatric rehabilitation goal is to identify 2 coping skills that assist with focus on reality for her psychotic symptoms goal. Over the next seven days, Psychiatric Rehabilitation staff will utilize individual psychotherapy and psychoeducation to assist the patient in reaching her treatment goal. The patient denied SI/HI/AH//VH.

## 2024-01-26 NOTE — BH INPATIENT PSYCHIATRY PROGRESS NOTE - NSBHMETABOLIC_PSY_ALL_CORE_FT
BMI: BMI (kg/m2): 28 (01-25-24 @ 19:01)  HbA1c: A1C with Estimated Average Glucose Result: 5.7 % (01-26-24 @ 08:30)    Glucose:   BP: --Vital Signs Last 24 Hrs  T(C): 36.8 (01-26-24 @ 07:31), Max: 36.8 (01-26-24 @ 07:31)  T(F): 98.3 (01-26-24 @ 07:31), Max: 98.3 (01-26-24 @ 07:31)  HR: --  BP: --  BP(mean): --  RR: 19 (01-26-24 @ 07:31) (19 - 19)  SpO2: 99% (01-26-24 @ 07:31) (99% - 99%)    Orthostatic VS  01-26-24 @ 07:31  Lying BP: --/-- HR: --  Sitting BP: 148/83 HR: 99  Standing BP: 153/87 HR: 106  Site: --  Mode: --  Orthostatic VS  01-25-24 @ 19:01  Lying BP: --/-- HR: --  Sitting BP: 135/84 HR: 94  Standing BP: 139/86 HR: 108  Site: --  Mode: --    Lipid Panel: Date/Time: 01-26-24 @ 08:30  Cholesterol, Serum: 209  LDL Cholesterol Calculated: 120  HDL Cholesterol, Serum: 79  Total Cholesterol/HDL Ration Measurement: --  Triglycerides, Serum: 51   BMI: BMI (kg/m2): 28 (01-25-24 @ 19:01)  HbA1c: A1C with Estimated Average Glucose Result: 5.7 % (01-26-24 @ 08:30)    Glucose:   BP: --Vital Signs Last 24 Hrs  T(C): 36.4 (01-27-24 @ 07:54), Max: 36.4 (01-27-24 @ 07:54)  T(F): 97.6 (01-27-24 @ 07:54), Max: 97.6 (01-27-24 @ 07:54)  HR: --  BP: --  BP(mean): --  RR: 19 (01-27-24 @ 07:54) (19 - 19)  SpO2: --    Orthostatic VS  01-27-24 @ 07:54  Lying BP: --/-- HR: --  Sitting BP: 134/84 HR: 104  Standing BP: 125/77 HR: 108  Site: --  Mode: --  Orthostatic VS  01-26-24 @ 07:31  Lying BP: --/-- HR: --  Sitting BP: 148/83 HR: 99  Standing BP: 153/87 HR: 106  Site: --  Mode: --  Orthostatic VS  01-25-24 @ 19:01  Lying BP: --/-- HR: --  Sitting BP: 135/84 HR: 94  Standing BP: 139/86 HR: 108  Site: --  Mode: --    Lipid Panel: Date/Time: 01-26-24 @ 08:30  Cholesterol, Serum: 209  LDL Cholesterol Calculated: 120  HDL Cholesterol, Serum: 79  Total Cholesterol/HDL Ration Measurement: --  Triglycerides, Serum: 51

## 2024-01-26 NOTE — BH INPATIENT PSYCHIATRY PROGRESS NOTE - PRN MEDS
MEDICATIONS  (PRN):  gabapentin 300 milliGRAM(s) Oral four times a day PRN anxiety  OLANZapine 5 milliGRAM(s) Oral every 4 hours PRN agitation or aggression  OLANZapine Injectable 5 milliGRAM(s) IntraMuscular once PRN agitation or aggression

## 2024-01-27 PROCEDURE — 99232 SBSQ HOSP IP/OBS MODERATE 35: CPT

## 2024-01-27 RX ADMIN — GABAPENTIN 300 MILLIGRAM(S): 400 CAPSULE ORAL at 09:31

## 2024-01-27 RX ADMIN — SERTRALINE 25 MILLIGRAM(S): 25 TABLET, FILM COATED ORAL at 09:31

## 2024-01-27 RX ADMIN — PALIPERIDONE 3 MILLIGRAM(S): 1.5 TABLET, EXTENDED RELEASE ORAL at 21:06

## 2024-01-27 NOTE — BH INPATIENT PSYCHIATRY PROGRESS NOTE - NSBHFUPINTERVALHXFT_PSY_A_CORE
Patient seen for depression and  psychosis.  Chart, medication, and labs reviewed.  Case discussed with nursing team. No interval events reported by nursing.  Patient is compliant with standing medications, tolerating well, no SE reported (no tremors, EPS, akathisia). Per nursing eating and sleeping well. No behavioral concerns, no prns for aggression.     Patient is seen in her room, amenable to  interview, she is  calm, cooperative but a bit irritable (minimally engaging in interview).  Reports she was admitted because “I wasn’t feeling good I wish I could hang myself.”  Reports continued low mood, anxiety , feeling fearful (but states she feels safe on unit).  Denies any intent to harm self on unit “how can I hurt myself here, I can’t do anything” engages in safety plan.   Patient denies any current AVH, per chart review presents with Capgras delusions, denies disorganization, mind reading abilities, thought insertion, ideas of reference, special martinez, or thought broadcasting. No acute medical concerns. VSS: 97.6, 134/84, 104, 19. Continue to monitor and provide therapeutic support

## 2024-01-27 NOTE — BH INPATIENT PSYCHIATRY PROGRESS NOTE - NSBHCHARTREVIEWVS_PSY_A_CORE FT
Vital Signs Last 24 Hrs  T(C): 36.4 (01-27-24 @ 07:54), Max: 36.4 (01-27-24 @ 07:54)  T(F): 97.6 (01-27-24 @ 07:54), Max: 97.6 (01-27-24 @ 07:54)  HR: --  BP: --  BP(mean): --  RR: 19 (01-27-24 @ 07:54) (19 - 19)  SpO2: --    Orthostatic VS  01-27-24 @ 07:54  Lying BP: --/-- HR: --  Sitting BP: 134/84 HR: 104  Standing BP: 125/77 HR: 108  Site: --  Mode: --  Orthostatic VS  01-26-24 @ 07:31  Lying BP: --/-- HR: --  Sitting BP: 148/83 HR: 99  Standing BP: 153/87 HR: 106  Site: --  Mode: --  Orthostatic VS  01-25-24 @ 19:01  Lying BP: --/-- HR: --  Sitting BP: 135/84 HR: 94  Standing BP: 139/86 HR: 108  Site: --  Mode: --

## 2024-01-27 NOTE — BH INPATIENT PSYCHIATRY PROGRESS NOTE - NSBHASSESSSUMMFT_PSY_ALL_CORE
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 She was stabilized on Olanzapine 5mg and Sertraline 100mg daily but did not follow up with treatment after discharge (referred to [facility]). She has been taking d/c medication sporadically and last took Olanzapine 5mg 2m ago and sertraline 100mg 2 weeks ago. She recently started treatment at [facility] for psychotherapy with plans for weekly or 2x week visits, denies substance use, denies hx of violence or aggression, no legal hx, no access to firearms, presents to [facility] seeking assessment for worsened symptoms of depression and psychosis. At the time of assessment, clinical picture is significant for depression with psychosis. She presents as anxious and distressed with ongoing paranoia and SI to OD. She is unable to safety plan in the community and clinically appropriate for admission. Agrees to VOL admit, and to work with day team, also trial of invega. Defer on all ssris/snris for now given unclear dx. Goal of VILLANUEVA for safety and compliance. R/o BAD, r/o SAD, r/o SCZ    PLAN  >would benefit from 9.13 vol admit  >q15 adequate at this time, CO not warranted in structured milieu  >Psych meds:  Start invega 3 mg qhs  Goal of VILLANUEVA for safety and compliance given hx of noncommpliance and suicide attempts with poor insight  Consider start of lithium  Started Zoloft 25mg daily  >PRNs:  Zyprexa 5 IM and PO, neurontin 300 QID PRN  Comorbids: overweight, none other known  NUT: BMI/height and weights, NUT consult  Family collateral  Psych collateral  G&M therapy  Dispo TBD-- consider PHP

## 2024-01-27 NOTE — BH INPATIENT PSYCHIATRY PROGRESS NOTE - NSBHMETABOLIC_PSY_ALL_CORE_FT
BMI: BMI (kg/m2): 28 (01-25-24 @ 19:01)  HbA1c: A1C with Estimated Average Glucose Result: 5.7 % (01-26-24 @ 08:30)    Glucose:   BP: --Vital Signs Last 24 Hrs  T(C): 36.4 (01-27-24 @ 07:54), Max: 36.4 (01-27-24 @ 07:54)  T(F): 97.6 (01-27-24 @ 07:54), Max: 97.6 (01-27-24 @ 07:54)  HR: --  BP: --  BP(mean): --  RR: 19 (01-27-24 @ 07:54) (19 - 19)  SpO2: --    Orthostatic VS  01-27-24 @ 07:54  Lying BP: --/-- HR: --  Sitting BP: 134/84 HR: 104  Standing BP: 125/77 HR: 108  Site: --  Mode: --  Orthostatic VS  01-26-24 @ 07:31  Lying BP: --/-- HR: --  Sitting BP: 148/83 HR: 99  Standing BP: 153/87 HR: 106  Site: --  Mode: --  Orthostatic VS  01-25-24 @ 19:01  Lying BP: --/-- HR: --  Sitting BP: 135/84 HR: 94  Standing BP: 139/86 HR: 108  Site: --  Mode: --    Lipid Panel: Date/Time: 01-26-24 @ 08:30  Cholesterol, Serum: 209  LDL Cholesterol Calculated: 120  HDL Cholesterol, Serum: 79  Total Cholesterol/HDL Ration Measurement: --  Triglycerides, Serum: 51

## 2024-01-27 NOTE — BH INPATIENT PSYCHIATRY PROGRESS NOTE - CURRENT MEDICATION
MEDICATIONS  (STANDING):  paliperidone ER 3 milliGRAM(s) Oral at bedtime  sertraline 25 milliGRAM(s) Oral daily    MEDICATIONS  (PRN):  gabapentin 300 milliGRAM(s) Oral four times a day PRN anxiety  OLANZapine 5 milliGRAM(s) Oral every 4 hours PRN agitation or aggression  OLANZapine Injectable 5 milliGRAM(s) IntraMuscular once PRN agitation or aggression

## 2024-01-28 PROCEDURE — 99232 SBSQ HOSP IP/OBS MODERATE 35: CPT

## 2024-01-28 RX ADMIN — SERTRALINE 25 MILLIGRAM(S): 25 TABLET, FILM COATED ORAL at 09:13

## 2024-01-28 RX ADMIN — PALIPERIDONE 3 MILLIGRAM(S): 1.5 TABLET, EXTENDED RELEASE ORAL at 20:43

## 2024-01-28 NOTE — BH INPATIENT PSYCHIATRY PROGRESS NOTE - NSBHFUPINTERVALHXFT_PSY_A_CORE
Patient seen for depression and  psychosis.  Chart, medication, and labs reviewed.  Case discussed with nursing team. No interval events reported by nursing.  Patient is compliant with standing medications, tolerating well, no SE reported (no tremors, EPS, akathisia). Per nursing eating and sleeping well. No behavioral concerns, no prns for aggression.     Patient is seen in her room, amenable to interview, she is calm, cooperative but a bit irritable, odd at times. Reports continued low mood, anxiety.  Denies any intent to harm self on unit “how can I hurt myself here, I can’t do anything” engages in safety plan. Patient requested to be discharged today, stating “ what’s the point to be here I don’t want to live” Reports having intent to harm self if she were to go home.  Patient reports she has not been eating here on the unit,  she is encouraged to walk to dayroom with writer to have breakfast pt replies “what’s the point in eating, what’s the use” Pt eventually walked to dayroom with writer, with much encouragement she ate breakfast.  Patient denies any intent to self harm while on unit she  is encouraged to go to staff.   Patient denies any current AVH, per chart review presents with Capgras delusions, denies disorganization, mind reading abilities, thought insertion, ideas of reference, special martinez, or thought broadcasting. No acute medical concerns. VSS: 97.5, 142/74, 104.Continue to monitor and provide therapeutic support

## 2024-01-28 NOTE — BH INPATIENT PSYCHIATRY PROGRESS NOTE - NSBHASSESSSUMMFT_PSY_ALL_CORE
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 She was stabilized on Olanzapine 5mg and Sertraline 100mg daily but did not follow up with treatment after discharge (referred to [facility]). She has been taking d/c medication sporadically and last took Olanzapine 5mg 2m ago and sertraline 100mg 2 weeks ago. She recently started treatment at [facility] for psychotherapy with plans for weekly or 2x week visits, denies substance use, denies hx of violence or aggression, no legal hx, no access to firearms, presents to [facility] seeking assessment for worsened symptoms of depression and psychosis. At the time of assessment, clinical picture is significant for depression with psychosis. She presents as anxious and distressed with ongoing paranoia and SI to OD. She is unable to safety plan in the community and clinically appropriate for admission. Agrees to VOL admit, and to work with day team, also trial of invega. Defer on all ssris/snris for now given unclear dx. Goal of VILLANUEVA for safety and compliance. R/o BAD, r/o SAD, r/o SCZ    PLAN  >would benefit from 9.13 vol admit  >q15 adequate at this time, CO not warranted in structured milieu  >Psych meds:  Start invega 3 mg qhs  Goal of VILLANUEVA for safety and compliance given hx of noncommpliance and suicide attempts with poor insight  Consider start of lithium  Started Zoloft 25mg daily  >PRNs:  Zyprexa 5 IM and PO, neurontin 300 QID PRN  Comorbids: overweight, none other known  NUT: BMI/height and weights, NUT consult  Family collateral  Psych collateral  G&M therapy  Dispo TBD-- consider PHP

## 2024-01-28 NOTE — BH INPATIENT PSYCHIATRY PROGRESS NOTE - NSBHMETABOLIC_PSY_ALL_CORE_FT
BMI: BMI (kg/m2): 28 (01-25-24 @ 19:01)  HbA1c: A1C with Estimated Average Glucose Result: 5.7 % (01-26-24 @ 08:30)    Glucose:   BP: --Vital Signs Last 24 Hrs  T(C): 36.4 (01-28-24 @ 08:40), Max: 36.4 (01-28-24 @ 08:40)  T(F): 97.5 (01-28-24 @ 08:40), Max: 97.5 (01-28-24 @ 08:40)  HR: --  BP: --  BP(mean): --  RR: --  SpO2: --    Orthostatic VS  01-28-24 @ 08:40  Lying BP: --/-- HR: --  Sitting BP: 142/74 HR: 104  Standing BP: 138/85 HR: 112  Site: upper left arm  Mode: electronic  Orthostatic VS  01-27-24 @ 07:54  Lying BP: --/-- HR: --  Sitting BP: 134/84 HR: 104  Standing BP: 125/77 HR: 108  Site: --  Mode: --    Lipid Panel: Date/Time: 01-26-24 @ 08:30  Cholesterol, Serum: 209  LDL Cholesterol Calculated: 120  HDL Cholesterol, Serum: 79  Total Cholesterol/HDL Ration Measurement: --  Triglycerides, Serum: 51

## 2024-01-28 NOTE — BH INPATIENT PSYCHIATRY PROGRESS NOTE - NSBHCHARTREVIEWVS_PSY_A_CORE FT
Vital Signs Last 24 Hrs  T(C): 36.4 (01-28-24 @ 08:40), Max: 36.4 (01-28-24 @ 08:40)  T(F): 97.5 (01-28-24 @ 08:40), Max: 97.5 (01-28-24 @ 08:40)  HR: --  BP: --  BP(mean): --  RR: --  SpO2: --    Orthostatic VS  01-28-24 @ 08:40  Lying BP: --/-- HR: --  Sitting BP: 142/74 HR: 104  Standing BP: 138/85 HR: 112  Site: upper left arm  Mode: electronic  Orthostatic VS  01-27-24 @ 07:54  Lying BP: --/-- HR: --  Sitting BP: 134/84 HR: 104  Standing BP: 125/77 HR: 108  Site: --  Mode: --

## 2024-01-29 PROCEDURE — 99232 SBSQ HOSP IP/OBS MODERATE 35: CPT

## 2024-01-29 RX ORDER — SERTRALINE 25 MG/1
50 TABLET, FILM COATED ORAL DAILY
Refills: 0 | Status: DISCONTINUED | OUTPATIENT
Start: 2024-01-29 | End: 2024-01-30

## 2024-01-29 RX ORDER — OLANZAPINE 15 MG/1
5 TABLET, FILM COATED ORAL AT BEDTIME
Refills: 0 | Status: DISCONTINUED | OUTPATIENT
Start: 2024-01-29 | End: 2024-01-30

## 2024-01-29 RX ORDER — SERTRALINE 25 MG/1
25 TABLET, FILM COATED ORAL ONCE
Refills: 0 | Status: DISCONTINUED | OUTPATIENT
Start: 2024-01-29 | End: 2024-02-07

## 2024-01-29 RX ADMIN — OLANZAPINE 5 MILLIGRAM(S): 15 TABLET, FILM COATED ORAL at 12:20

## 2024-01-29 RX ADMIN — OLANZAPINE 5 MILLIGRAM(S): 15 TABLET, FILM COATED ORAL at 21:17

## 2024-01-29 RX ADMIN — SERTRALINE 25 MILLIGRAM(S): 25 TABLET, FILM COATED ORAL at 09:14

## 2024-01-29 RX ADMIN — Medication 1 MILLIGRAM(S): at 12:51

## 2024-01-29 NOTE — BH INPATIENT PSYCHIATRY PROGRESS NOTE - ATTENDING COMMENTS
Care was discussed and reviewed in interdisciplinary treatment team.  I, Brittni Pennington MD, have reviewed and verified the documentation.  I independently performed the documented medical decision making.     Care was discussed and reviewed in interdisciplinary treatment team.  I, Brittni Pennington MD, have reviewed and verified the documentation.  I independently performed the documented medical decision making.    Patient was seen and evaluated earlier today with the medical students present during the assessment. Patient reported that she didn't like how Invega was making her feel but was not able to elaborate. She admitted that she has been hearing voice and seeing the shadows of loved ones that pass away. At the time of the assessment patient said that she was experiencing SI but she was able to contract for safety.     30 min after our conversation patient was seen screaming in her room that she wanted to die, she was asking to be discharge because he wanted for her life to end. Patient kept repeating that she is going to die today. She doesn't have hands or has a name. She didn't wanted for us to communicate with her family. Patient was acting very bizarre and was making very odd comments about the staff.

## 2024-01-29 NOTE — BH INPATIENT PSYCHIATRY PROGRESS NOTE - NSBHFUPINTERVALHXFT_PSY_A_CORE
Patient was seen for f/u for Depression and psychosis. Chart reviewed and case discussed in team meeting. Patient was screaming in room, staff found her with her hands around her neck and blanket draped around her shoulders saying "I want to die". Patient could not explain what she was doing or whether she was attempting suicide saying "there is no way to die here". Patient was extremely disorganized, reporting "I don't have hands. Don't call me Alma, I don't have a name". Patient reported there is no reason to live, feels she caused her family hurt. Patient was not redirectable, refused to contract for safety, was placed on CO 1:1 for SI. Patient continues to refuse consent to speak with Sons.

## 2024-01-29 NOTE — BH INPATIENT PSYCHIATRY PROGRESS NOTE - NSBHASSESSSUMMFT_PSY_ALL_CORE
ASSESSMENT: Patient is a 58-year-old female, single, domiciled with son, has 2 adult son, employed house cleaning and [employer] , PMH: hx of knee surgery, dx with depression with psychosis, 1 prior admission at [facility] from 5/26 to 6/6/23 for SI and depression, 2 prior SA – 1st age [age]yo by OD and was held overnight at a hospital, most recent in May 2023  where she attempted to cut her wrist and took ½ bottle of NyQuil prior to her admission. She was stabilized on Olanzapine 5mg and Sertraline 100mg daily but did not follow up with treatment after discharge (referred to [facility]). She has been taking d/c medication sporadically and last took Olanzapine 5mg 2m ago and sertraline 100mg 2 weeks ago. She recently started treatment at [facility] for psychotherapy with plans for weekly or 2x week visits, denies substance use, denies hx of violence or aggression, no legal hx, no access to firearms, presents to [facility] seeking assessment for worsened symptoms of depression and psychosis. At the time of assessment, clinical picture is significant for depression with psychosis. She presents as anxious and distressed with ongoing paranoia and SI to OD. She is unable to safety plan in the community and clinically appropriate for admission. Agrees to VOL admit, and to work with day team, also trial of invega. Defer on all ssris/snris for now given unclear dx. Goal of VILLANUEVA for safety and compliance. R/o BAD, r/o SAD, r/o SCZ    On assessment today, patient remains depressed, suicidal, made a suicide gesture, paranoid, delusional.     PLAN  >would benefit from 9.13 vol admit  >q15 adequate at this time, CO not warranted in structured milieu  >Psych meds:  D/C invega  Started Zyprexa 24mg   Goal of VILLANUEVA for safety and compliance given hx of noncommpliance and suicide attempts with poor insight  Consider start of lithium  Increased Zoloft to 50mg daily  >PRNs:  Zyprexa 5 IM and PO, neurontin 300 QID PRN  Comorbids: overweight, none other known  NUT: BMI/height and weights, NUT consult  Family collateral  Psych collateral  G&M therapy  Dispo TBD-- consider PHP

## 2024-01-29 NOTE — BH INPATIENT PSYCHIATRY PROGRESS NOTE - PRN MEDS
MEDICATIONS  (PRN):  gabapentin 300 milliGRAM(s) Oral four times a day PRN anxiety  LORazepam     Tablet 1 milliGRAM(s) Oral every 6 hours PRN Anxiety/Agitation  LORazepam   Injectable 1 milliGRAM(s) IntraMuscular once PRN Severe anxiety/Agitation  OLANZapine 5 milliGRAM(s) Oral every 4 hours PRN agitation or aggression  OLANZapine Injectable 5 milliGRAM(s) IntraMuscular once PRN agitation or aggression

## 2024-01-29 NOTE — BH INPATIENT PSYCHIATRY PROGRESS NOTE - CURRENT MEDICATION
MEDICATIONS  (STANDING):  OLANZapine 5 milliGRAM(s) Oral at bedtime  sertraline 25 milliGRAM(s) Oral once  sertraline 50 milliGRAM(s) Oral daily    MEDICATIONS  (PRN):  gabapentin 300 milliGRAM(s) Oral four times a day PRN anxiety  LORazepam     Tablet 1 milliGRAM(s) Oral every 6 hours PRN Anxiety/Agitation  LORazepam   Injectable 1 milliGRAM(s) IntraMuscular once PRN Severe anxiety/Agitation  OLANZapine 5 milliGRAM(s) Oral every 4 hours PRN agitation or aggression  OLANZapine Injectable 5 milliGRAM(s) IntraMuscular once PRN agitation or aggression   MEDICATIONS  (STANDING):  OLANZapine 5 milliGRAM(s) Oral at bedtime  sertraline 50 milliGRAM(s) Oral daily  sertraline 25 milliGRAM(s) Oral once    MEDICATIONS  (PRN):  gabapentin 300 milliGRAM(s) Oral four times a day PRN anxiety  LORazepam     Tablet 1 milliGRAM(s) Oral every 6 hours PRN Anxiety/Agitation  LORazepam   Injectable 1 milliGRAM(s) IntraMuscular once PRN Severe anxiety/Agitation  OLANZapine 5 milliGRAM(s) Oral every 4 hours PRN agitation or aggression  OLANZapine Injectable 5 milliGRAM(s) IntraMuscular once PRN agitation or aggression

## 2024-01-29 NOTE — BH INPATIENT PSYCHIATRY PROGRESS NOTE - NSBHCHARTREVIEWVS_PSY_A_CORE FT
Vital Signs Last 24 Hrs  T(C): 37.4 (01-29-24 @ 08:36), Max: 37.4 (01-29-24 @ 08:36)  T(F): 99.4 (01-29-24 @ 08:36), Max: 99.4 (01-29-24 @ 08:36)  HR: --  BP: --  BP(mean): --  RR: --  SpO2: --    Orthostatic VS  01-29-24 @ 08:36  Lying BP: --/-- HR: --  Sitting BP: 138/84 HR: 90  Standing BP: 141/88 HR: 105  Site: --  Mode: --  Orthostatic VS  01-28-24 @ 08:40  Lying BP: --/-- HR: --  Sitting BP: 142/74 HR: 104  Standing BP: 138/85 HR: 112  Site: upper left arm  Mode: electronic

## 2024-01-29 NOTE — BH INPATIENT PSYCHIATRY PROGRESS NOTE - NSBHMETABOLIC_PSY_ALL_CORE_FT
BMI: BMI (kg/m2): 28 (01-25-24 @ 19:01)  HbA1c: A1C with Estimated Average Glucose Result: 5.7 % (01-26-24 @ 08:30)    Glucose:   BP: --Vital Signs Last 24 Hrs  T(C): 37.4 (01-29-24 @ 08:36), Max: 37.4 (01-29-24 @ 08:36)  T(F): 99.4 (01-29-24 @ 08:36), Max: 99.4 (01-29-24 @ 08:36)  HR: --  BP: --  BP(mean): --  RR: --  SpO2: --    Orthostatic VS  01-29-24 @ 08:36  Lying BP: --/-- HR: --  Sitting BP: 138/84 HR: 90  Standing BP: 141/88 HR: 105  Site: --  Mode: --  Orthostatic VS  01-28-24 @ 08:40  Lying BP: --/-- HR: --  Sitting BP: 142/74 HR: 104  Standing BP: 138/85 HR: 112  Site: upper left arm  Mode: electronic    Lipid Panel: Date/Time: 01-26-24 @ 08:30  Cholesterol, Serum: 209  LDL Cholesterol Calculated: 120  HDL Cholesterol, Serum: 79  Total Cholesterol/HDL Ration Measurement: --  Triglycerides, Serum: 51

## 2024-01-30 LAB — VIT B1 SERPL-MCNC: 122.4 NMOL/L — SIGNIFICANT CHANGE UP (ref 66.5–200)

## 2024-01-30 PROCEDURE — 99232 SBSQ HOSP IP/OBS MODERATE 35: CPT

## 2024-01-30 RX ORDER — SERTRALINE 25 MG/1
75 TABLET, FILM COATED ORAL DAILY
Refills: 0 | Status: DISCONTINUED | OUTPATIENT
Start: 2024-01-30 | End: 2024-02-13

## 2024-01-30 RX ORDER — OLANZAPINE 15 MG/1
10 TABLET, FILM COATED ORAL AT BEDTIME
Refills: 0 | Status: DISCONTINUED | OUTPATIENT
Start: 2024-01-30 | End: 2024-02-01

## 2024-01-30 RX ADMIN — OLANZAPINE 10 MILLIGRAM(S): 15 TABLET, FILM COATED ORAL at 23:20

## 2024-01-30 RX ADMIN — SERTRALINE 50 MILLIGRAM(S): 25 TABLET, FILM COATED ORAL at 09:30

## 2024-01-30 NOTE — BH INPATIENT PSYCHIATRY PROGRESS NOTE - CURRENT MEDICATION
MEDICATIONS  (STANDING):  OLANZapine 5 milliGRAM(s) Oral at bedtime  sertraline 50 milliGRAM(s) Oral daily  sertraline 25 milliGRAM(s) Oral once    MEDICATIONS  (PRN):  gabapentin 300 milliGRAM(s) Oral four times a day PRN anxiety  LORazepam     Tablet 1 milliGRAM(s) Oral every 6 hours PRN Anxiety/Agitation  LORazepam   Injectable 1 milliGRAM(s) IntraMuscular once PRN Severe anxiety/Agitation  OLANZapine 5 milliGRAM(s) Oral every 4 hours PRN agitation or aggression  OLANZapine Injectable 5 milliGRAM(s) IntraMuscular once PRN agitation or aggression   MEDICATIONS  (STANDING):  OLANZapine 10 milliGRAM(s) Oral at bedtime  sertraline 25 milliGRAM(s) Oral once  sertraline 75 milliGRAM(s) Oral daily    MEDICATIONS  (PRN):  gabapentin 300 milliGRAM(s) Oral four times a day PRN anxiety  LORazepam     Tablet 1 milliGRAM(s) Oral every 6 hours PRN Anxiety/Agitation  LORazepam   Injectable 1 milliGRAM(s) IntraMuscular once PRN Severe anxiety/Agitation  OLANZapine 5 milliGRAM(s) Oral every 4 hours PRN agitation or aggression  OLANZapine Injectable 5 milliGRAM(s) IntraMuscular once PRN agitation or aggression

## 2024-01-30 NOTE — BH INPATIENT PSYCHIATRY PROGRESS NOTE - NSBHASSESSSUMMFT_PSY_ALL_CORE
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 She was stabilized on Olanzapine 5mg and Sertraline 100mg daily but did not follow up with treatment after discharge (referred to [facility]). She has been taking d/c medication sporadically and last took Olanzapine 5mg 2m ago and sertraline 100mg 2 weeks ago. She recently started treatment at [facility] for psychotherapy with plans for weekly or 2x week visits, denies substance use, denies hx of violence or aggression, no legal hx, no access to firearms, presents to [facility] seeking assessment for worsened symptoms of depression and psychosis. At the time of assessment, clinical picture is significant for depression with psychosis. She presents as anxious and distressed with ongoing paranoia and SI to OD. She is unable to safety plan in the community and clinically appropriate for admission. Agrees to VOL admit, and to work with day team, also trial of invega. Defer on all ssris/snris for now given unclear dx. Goal of VILLANUEVA for safety and compliance. R/o BAD, r/o SAD, r/o SCZ    On assessment today, patient remains depressed, suicidal, distressed, despondent, paranoid, delusional.     PLAN  >would benefit from 9.13 vol admit  >q15 adequate at this time, CO not warranted in structured milieu  >Psych meds:  D/C invega  Increased Zyprexa to 10mg   Goal of VILLANUEVA for safety and compliance given hx of noncompliance and suicide attempts with poor insight  Consider start of lithium  Increased Zoloft to 75mg daily  >PRNs:  Zyprexa 5 IM and PO, neurontin 300 QID PRN  Comorbids: overweight, none other known  NUT: BMI/height and weights, NUT consult  Family collateral  Psych collateral  G&M therapy  Dispo TBD-- consider PHP

## 2024-01-30 NOTE — BH INPATIENT PSYCHIATRY PROGRESS NOTE - NSBHMETABOLIC_PSY_ALL_CORE_FT
BMI: BMI (kg/m2): 28 (01-25-24 @ 19:01)  HbA1c: A1C with Estimated Average Glucose Result: 5.7 % (01-26-24 @ 08:30)    Glucose:   BP: --Vital Signs Last 24 Hrs  T(C): 36.4 (01-30-24 @ 07:52), Max: 36.4 (01-30-24 @ 07:52)  T(F): 97.6 (01-30-24 @ 07:52), Max: 97.6 (01-30-24 @ 07:52)  HR: --  BP: --  BP(mean): --  RR: 18 (01-30-24 @ 07:52) (18 - 18)  SpO2: 99% (01-30-24 @ 07:52) (99% - 99%)    Orthostatic VS  01-30-24 @ 07:52  Lying BP: --/-- HR: --  Sitting BP: 136/77 HR: 90  Standing BP: 128/74 HR: 100  Site: --  Mode: --  Orthostatic VS  01-29-24 @ 08:36  Lying BP: --/-- HR: --  Sitting BP: 138/84 HR: 90  Standing BP: 141/88 HR: 105  Site: --  Mode: --    Lipid Panel: Date/Time: 01-26-24 @ 08:30  Cholesterol, Serum: 209  LDL Cholesterol Calculated: 120  HDL Cholesterol, Serum: 79  Total Cholesterol/HDL Ration Measurement: --  Triglycerides, Serum: 51

## 2024-01-30 NOTE — BH INPATIENT PSYCHIATRY PROGRESS NOTE - NSBHFUPINTERVALHXFT_PSY_A_CORE
Patient was seen for f/u for Depression and psychosis. Chart reviewed and case discussed in team meeting. Patient reported she wanted to die, asked what would happen if someone dies in the hospital, wants to hurt herself but did not report a plan or contract for safety. Patient remains on CO 1:1 for SI. Patient was hopeless, stating there is no reason to live, that she has hurt people and deserves to die. Patient was in bed most of the day, appeared depressed, despondent, distressed. Discussed increased medication, patient agreed. Patient gave consent to speak with Sons.

## 2024-01-30 NOTE — BH INPATIENT PSYCHIATRY PROGRESS NOTE - ATTENDING COMMENTS
Care was discussed and reviewed in interdisciplinary treatment team.  I, Brittni Pennington MD, have reviewed and verified the documentation.  I independently performed the documented medical decision making.

## 2024-01-30 NOTE — BH INPATIENT PSYCHIATRY PROGRESS NOTE - NSBHCHARTREVIEWVS_PSY_A_CORE FT
Vital Signs Last 24 Hrs  T(C): 36.4 (01-30-24 @ 07:52), Max: 36.4 (01-30-24 @ 07:52)  T(F): 97.6 (01-30-24 @ 07:52), Max: 97.6 (01-30-24 @ 07:52)  HR: --  BP: --  BP(mean): --  RR: 18 (01-30-24 @ 07:52) (18 - 18)  SpO2: 99% (01-30-24 @ 07:52) (99% - 99%)    Orthostatic VS  01-30-24 @ 07:52  Lying BP: --/-- HR: --  Sitting BP: 136/77 HR: 90  Standing BP: 128/74 HR: 100  Site: --  Mode: --  Orthostatic VS  01-29-24 @ 08:36  Lying BP: --/-- HR: --  Sitting BP: 138/84 HR: 90  Standing BP: 141/88 HR: 105  Site: --  Mode: --

## 2024-01-30 NOTE — BH TREATMENT PLAN - NSCMSPTSTRENGTHS_PSY_ALL_CORE
Expressive of emotions/Financial stability/Intact employment/Intact family/Interpersonal skills/Supportive family Expressive of emotions/Financial stability/Intact employment/Intact family/Interpersonal skills/Physically healthy/Supportive family

## 2024-01-31 PROCEDURE — 99232 SBSQ HOSP IP/OBS MODERATE 35: CPT

## 2024-01-31 RX ADMIN — OLANZAPINE 10 MILLIGRAM(S): 15 TABLET, FILM COATED ORAL at 21:25

## 2024-01-31 RX ADMIN — SERTRALINE 75 MILLIGRAM(S): 25 TABLET, FILM COATED ORAL at 10:05

## 2024-01-31 RX ADMIN — Medication 1 MILLIGRAM(S): at 21:24

## 2024-01-31 NOTE — BH INPATIENT PSYCHIATRY PROGRESS NOTE - NSBHFUPINTERVALHXFT_PSY_A_CORE
Patient was seen for f/u for Depression and psychosis. Chart reviewed and case discussed in team meeting. Patient reported she feels "better". Thoughts of harming herself has "crossed her mind" but she has no plan. She has no thoughts of harming others.  She reports hearing voices telling her "to leave" and commanding her to "hurt herself". She does not report to have any auditory hallucinations. Patient remains on CO 1:1 for SI. She does not feel like people are out to get her. She rates her depression as a 7/10. She is "worried about everything", feels "hopeless" and "helpless". She has "low motivation". She has attended one group session and encouraged to attend more. She is interested in seeing her sons. Patient was in bed most of the day, appeared depressed, despondent, distressed. She reports no side effects with her current medication regimen.

## 2024-01-31 NOTE — BH INPATIENT PSYCHIATRY PROGRESS NOTE - NSBHMETABOLIC_PSY_ALL_CORE_FT
BMI: BMI (kg/m2): 28 (01-25-24 @ 19:01)  HbA1c: A1C with Estimated Average Glucose Result: 5.7 % (01-26-24 @ 08:30)    Glucose:   BP: --Vital Signs Last 24 Hrs  T(C): 36.7 (01-31-24 @ 07:43), Max: 36.7 (01-31-24 @ 07:43)  T(F): 98 (01-31-24 @ 07:43), Max: 98 (01-31-24 @ 07:43)  HR: --  BP: --  BP(mean): --  RR: 18 (01-31-24 @ 07:43) (18 - 18)  SpO2: 99% (01-31-24 @ 07:43) (99% - 99%)    Orthostatic VS  01-31-24 @ 07:43  Lying BP: --/-- HR: --  Sitting BP: 120/72 HR: 106  Standing BP: 118/68 HR: 100  Site: --  Mode: --  Orthostatic VS  01-30-24 @ 07:52  Lying BP: --/-- HR: --  Sitting BP: 136/77 HR: 90  Standing BP: 128/74 HR: 100  Site: --  Mode: --    Lipid Panel: Date/Time: 01-26-24 @ 08:30  Cholesterol, Serum: 209  LDL Cholesterol Calculated: 120  HDL Cholesterol, Serum: 79  Total Cholesterol/HDL Ration Measurement: --  Triglycerides, Serum: 51

## 2024-01-31 NOTE — BH INPATIENT PSYCHIATRY PROGRESS NOTE - NSBHATTESTTYPEVISIT_PSY_A_CORE
On-site Attending supervising LE (99XXX codes) On-site Attending with Resident/Fellow/Student and LE (99XXX codes)

## 2024-01-31 NOTE — BH INPATIENT PSYCHIATRY PROGRESS NOTE - NSBHCHARTREVIEWVS_PSY_A_CORE FT
Vital Signs Last 24 Hrs  T(C): 36.7 (01-31-24 @ 07:43), Max: 36.7 (01-31-24 @ 07:43)  T(F): 98 (01-31-24 @ 07:43), Max: 98 (01-31-24 @ 07:43)  HR: --  BP: --  BP(mean): --  RR: 18 (01-31-24 @ 07:43) (18 - 18)  SpO2: 99% (01-31-24 @ 07:43) (99% - 99%)    Orthostatic VS  01-31-24 @ 07:43  Lying BP: --/-- HR: --  Sitting BP: 120/72 HR: 106  Standing BP: 118/68 HR: 100  Site: --  Mode: --  Orthostatic VS  01-30-24 @ 07:52  Lying BP: --/-- HR: --  Sitting BP: 136/77 HR: 90  Standing BP: 128/74 HR: 100  Site: --  Mode: --

## 2024-01-31 NOTE — BH INPATIENT PSYCHIATRY PROGRESS NOTE - CURRENT MEDICATION
MEDICATIONS  (STANDING):  OLANZapine 10 milliGRAM(s) Oral at bedtime  sertraline 75 milliGRAM(s) Oral daily  sertraline 25 milliGRAM(s) Oral once    MEDICATIONS  (PRN):  gabapentin 300 milliGRAM(s) Oral four times a day PRN anxiety  LORazepam     Tablet 1 milliGRAM(s) Oral every 6 hours PRN Anxiety/Agitation  LORazepam   Injectable 1 milliGRAM(s) IntraMuscular once PRN Severe anxiety/Agitation  OLANZapine 5 milliGRAM(s) Oral every 4 hours PRN agitation or aggression  OLANZapine Injectable 5 milliGRAM(s) IntraMuscular once PRN agitation or aggression   MEDICATIONS  (STANDING):  OLANZapine 10 milliGRAM(s) Oral at bedtime  sertraline 25 milliGRAM(s) Oral once  sertraline 75 milliGRAM(s) Oral daily    MEDICATIONS  (PRN):  gabapentin 300 milliGRAM(s) Oral four times a day PRN anxiety  LORazepam     Tablet 1 milliGRAM(s) Oral every 6 hours PRN Anxiety/Agitation  LORazepam   Injectable 1 milliGRAM(s) IntraMuscular once PRN Severe anxiety/Agitation  OLANZapine 5 milliGRAM(s) Oral every 4 hours PRN agitation or aggression  OLANZapine Injectable 5 milliGRAM(s) IntraMuscular once PRN agitation or aggression

## 2024-01-31 NOTE — BH INPATIENT PSYCHIATRY PROGRESS NOTE - NSBHATTESTCOMMENTATTENDFT_PSY_A_CORE
Care was discussed and reviewed in interdisciplinary treatment team.  I, Brittin Pennington MD, have reviewed and verified the documentation.  I independently performed the documented medical decision making.

## 2024-01-31 NOTE — BH INPATIENT PSYCHIATRY PROGRESS NOTE - NSBHASSESSSUMMFT_PSY_ALL_CORE
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 She was stabilized on Olanzapine 5mg and Sertraline 100mg daily but did not follow up with treatment after discharge (referred to [facility]). She has been taking d/c medication sporadically and last took Olanzapine 5mg 2m ago and sertraline 100mg 2 weeks ago. She recently started treatment at [facility] for psychotherapy with plans for weekly or 2x week visits, denies substance use, denies hx of violence or aggression, no legal hx, no access to firearms, presents to [facility] seeking assessment for worsened symptoms of depression and psychosis. At the time of assessment, clinical picture is significant for depression with psychosis. She presents as anxious and distressed with ongoing paranoia and SI to OD. She is unable to safety plan in the community and clinically appropriate for admission. Agrees to VOL admit, and to work with day team, also trial of invega. Defer on all ssris/snris for now given unclear dx. Goal of VILLANUEVA for safety and compliance. R/o BAD, r/o SAD, r/o SCZ    On assessment today, patient remains depressed, suicidal, distressed, despondent, paranoid, delusional.     PLAN  >would benefit from 9.13 vol admit  >q15 adequate at this time, CO not warranted in structured milieu  >Psych meds:  D/C invega  Continue Zyprexa 10mg   Goal of VILLANUEVA for safety and compliance given hx of noncompliance and suicide attempts with poor insight  Consider start of lithium  Continue Zoloft 75mg daily  >PRNs:  Zyprexa 5 IM and PO, neurontin 300 QID PRN  Comorbids: overweight, none other known  NUT: BMI/height and weights, NUT consult  Family collateral  Psych collateral  G&M therapy  Dispo TBD-- consider PHP ASSESSMENT: Patient is a 58-year-old female, single, domiciled with son, has 2 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 She was stabilized on Olanzapine 5mg and Sertraline 100mg daily but did not follow up with treatment after discharge (referred to [facility]). She has been taking d/c medication sporadically and last took Olanzapine 5mg 2m ago and sertraline 100mg 2 weeks ago. She recently started treatment at [facility] for psychotherapy with plans for weekly or 2x week visits, denies substance use, denies hx of violence or aggression, no legal hx, no access to firearms, presents to [facility] seeking assessment for worsened symptoms of depression and psychosis. At the time of assessment, clinical picture is significant for depression with psychosis. She presents as anxious and distressed with ongoing paranoia and SI to OD. She is unable to safety plan in the community and clinically appropriate for admission. Agrees to VOL admit, and to work with day team, also trial of invega. Defer on all ssris/snris for now given unclear dx. Goal of VILLANUEVA for safety and compliance. R/o BAD, r/o SAD, r/o SCZ    On assessment today, patient remains depressed, suicidal, distressed, despondent, paranoid, delusional. reported CAH to harm self.     PLAN  >would benefit from 9.13 vol admit  >c/w constant observation for safety.   >Psych meds:  D/C invega  Continue Zyprexa 10mg   Goal of VILLANUEVA for safety and compliance given hx of noncompliance and suicide attempts with poor insight  Consider start of lithium  Continue Zoloft 75mg daily  >PRNs:  Zyprexa 5 IM and PO, neurontin 300 QID PRN  Comorbids: overweight, none other known  NUT: BMI/height and weights, NUT consult  Family collateral  Psych collateral  G&M therapy  Dispo TBD-- consider PHP

## 2024-02-01 PROCEDURE — 99232 SBSQ HOSP IP/OBS MODERATE 35: CPT

## 2024-02-01 RX ORDER — LANOLIN ALCOHOL/MO/W.PET/CERES
5 CREAM (GRAM) TOPICAL AT BEDTIME
Refills: 0 | Status: DISCONTINUED | OUTPATIENT
Start: 2024-02-01 | End: 2024-02-13

## 2024-02-01 RX ORDER — OLANZAPINE 15 MG/1
15 TABLET, FILM COATED ORAL AT BEDTIME
Refills: 0 | Status: DISCONTINUED | OUTPATIENT
Start: 2024-02-01 | End: 2024-02-13

## 2024-02-01 RX ADMIN — OLANZAPINE 15 MILLIGRAM(S): 15 TABLET, FILM COATED ORAL at 21:46

## 2024-02-01 RX ADMIN — Medication 1 MILLIGRAM(S): at 12:30

## 2024-02-01 RX ADMIN — Medication 5 MILLIGRAM(S): at 22:11

## 2024-02-01 RX ADMIN — SERTRALINE 75 MILLIGRAM(S): 25 TABLET, FILM COATED ORAL at 09:59

## 2024-02-01 NOTE — BH INPATIENT PSYCHIATRY PROGRESS NOTE - NSBHCHARTREVIEWVS_PSY_A_CORE FT
Vital Signs Last 24 Hrs  T(C): 36.4 (02-01-24 @ 07:26), Max: 36.4 (02-01-24 @ 07:26)  T(F): 97.5 (02-01-24 @ 07:26), Max: 97.5 (02-01-24 @ 07:26)  HR: --  BP: --  BP(mean): --  RR: 18 (02-01-24 @ 07:26) (18 - 18)  SpO2: --    Orthostatic VS  02-01-24 @ 07:26  Lying BP: --/-- HR: --  Sitting BP: 127/74 HR: 93  Standing BP: 126/75 HR: 103  Site: --  Mode: --  Orthostatic VS  01-31-24 @ 07:43  Lying BP: --/-- HR: --  Sitting BP: 120/72 HR: 106  Standing BP: 118/68 HR: 100  Site: --  Mode: --

## 2024-02-01 NOTE — BH INPATIENT PSYCHIATRY PROGRESS NOTE - NSBHFUPINTERVALHXFT_PSY_A_CORE
Patient was seen for f/u for Depression and psychosis. Chart reviewed and case discussed in team meeting. Patient reported she feels "much better". She continues to have passive thoughts of harming herself. She reports that she has "no courage" and "no resources in the hospital" to harm herself. She thought about "drinking shampoo" but doesn't think it's lethal as the hospital staff would "pump her stomach out". She has not thought about cutting herself this time but reports a history of cutting herself in the past which led to her previous hospitalization. She has no thoughts of harming others.  She continues to hear voices commanding her to "hurt herself" and telling her that she "is lost". She does not think she will get better. She does not report to have any visual hallucinations. Patient remains on CO 1:1 for SI. She has referential delusions feeling like her "life is on TV". She continues to feel "hopeless" and "helpless". She enjoys walking around the unit to help escape the voices. Patient reports she had a good visit with her son Bradley. She reports no side effects with her current medication regimen and agrees with the increase in Xyprexa.

## 2024-02-01 NOTE — BH INPATIENT PSYCHIATRY PROGRESS NOTE - NSBHMETABOLIC_PSY_ALL_CORE_FT
BMI: BMI (kg/m2): 28 (01-25-24 @ 19:01)  HbA1c: A1C with Estimated Average Glucose Result: 5.7 % (01-26-24 @ 08:30)    Glucose:   BP: --Vital Signs Last 24 Hrs  T(C): 36.4 (02-01-24 @ 07:26), Max: 36.4 (02-01-24 @ 07:26)  T(F): 97.5 (02-01-24 @ 07:26), Max: 97.5 (02-01-24 @ 07:26)  HR: --  BP: --  BP(mean): --  RR: 18 (02-01-24 @ 07:26) (18 - 18)  SpO2: --    Orthostatic VS  02-01-24 @ 07:26  Lying BP: --/-- HR: --  Sitting BP: 127/74 HR: 93  Standing BP: 126/75 HR: 103  Site: --  Mode: --  Orthostatic VS  01-31-24 @ 07:43  Lying BP: --/-- HR: --  Sitting BP: 120/72 HR: 106  Standing BP: 118/68 HR: 100  Site: --  Mode: --    Lipid Panel: Date/Time: 01-26-24 @ 08:30  Cholesterol, Serum: 209  LDL Cholesterol Calculated: 120  HDL Cholesterol, Serum: 79  Total Cholesterol/HDL Ration Measurement: --  Triglycerides, Serum: 51

## 2024-02-01 NOTE — BH INPATIENT PSYCHIATRY PROGRESS NOTE - CURRENT MEDICATION
MEDICATIONS  (STANDING):  OLANZapine 10 milliGRAM(s) Oral at bedtime  sertraline 25 milliGRAM(s) Oral once  sertraline 75 milliGRAM(s) Oral daily    MEDICATIONS  (PRN):  gabapentin 300 milliGRAM(s) Oral four times a day PRN anxiety  LORazepam     Tablet 1 milliGRAM(s) Oral every 6 hours PRN Anxiety/Agitation  LORazepam   Injectable 1 milliGRAM(s) IntraMuscular once PRN Severe anxiety/Agitation  OLANZapine 5 milliGRAM(s) Oral every 4 hours PRN agitation or aggression  OLANZapine Injectable 5 milliGRAM(s) IntraMuscular once PRN agitation or aggression   MEDICATIONS  (STANDING):  OLANZapine 15 milliGRAM(s) Oral at bedtime  sertraline 75 milliGRAM(s) Oral daily  sertraline 25 milliGRAM(s) Oral once    MEDICATIONS  (PRN):  gabapentin 300 milliGRAM(s) Oral four times a day PRN anxiety  LORazepam     Tablet 1 milliGRAM(s) Oral every 6 hours PRN Anxiety/Agitation  LORazepam   Injectable 1 milliGRAM(s) IntraMuscular once PRN Severe anxiety/Agitation  OLANZapine 5 milliGRAM(s) Oral every 4 hours PRN agitation or aggression  OLANZapine Injectable 5 milliGRAM(s) IntraMuscular once PRN agitation or aggression

## 2024-02-01 NOTE — BH INPATIENT PSYCHIATRY PROGRESS NOTE - NSBHASSESSSUMMFT_PSY_ALL_CORE
ASSESSMENT: Patient is a 58-year-old female, single, domiciled with son, has 2 adult son, employed house cleaning and [employer] , PMH: hx of knee surgery, dx with depression with psychosis, 1 prior admission at [facility] from 5/26 to 6/6/23 for SI and depression, 2 prior SA – 1st age [age]yo by OD and was held overnight at a hospital, most recent in May 2023  where she attempted to cut her wrist and took ½ bottle of NyQuil prior to her admission. She was stabilized on Olanzapine 5mg and Sertraline 100mg daily but did not follow up with treatment after discharge (referred to [facility]). She has been taking d/c medication sporadically and last took Olanzapine 5mg 2m ago and sertraline 100mg 2 weeks ago. She recently started treatment at [facility] for psychotherapy with plans for weekly or 2x week visits, denies substance use, denies hx of violence or aggression, no legal hx, no access to firearms, presents to [facility] seeking assessment for worsened symptoms of depression and psychosis. At the time of assessment, clinical picture is significant for depression with psychosis. She presents as anxious and distressed with ongoing paranoia and SI to OD. She is unable to safety plan in the community and clinically appropriate for admission. Agrees to VOL admit, and to work with day team, also trial of invega. Defer on all ssris/snris for now given unclear dx. Goal of VILLANUEVA for safety and compliance. R/o BAD, r/o SAD, r/o SCZ    On assessment today, patient remains depressed, suicidal, distressed, despondent, paranoid, delusional. reported CAH to harm self.     PLAN  >would benefit from 9.13 vol admit  >c/w constant observation for safety.   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 She was stabilized on Olanzapine 5mg and Sertraline 100mg daily but did not follow up with treatment after discharge (referred to [facility]). She has been taking d/c medication sporadically and last took Olanzapine 5mg 2m ago and sertraline 100mg 2 weeks ago. She recently started treatment at [facility] for psychotherapy with plans for weekly or 2x week visits, denies substance use, denies hx of violence or aggression, no legal hx, no access to firearms, presents to [facility] seeking assessment for worsened symptoms of depression and psychosis. At the time of assessment, clinical picture is significant for depression with psychosis. She presents as anxious and distressed with ongoing paranoia and SI to OD. She is unable to safety plan in the community and clinically appropriate for admission. Agrees to VOL admit, and to work with day team, also trial of invega. Defer on all ssris/snris for now given unclear dx. Goal of VILLANUEVA for safety and compliance. R/o BAD, r/o SAD, r/o SCZ    On assessment today, patient remains depressed, suicidal, distressed, despondent, paranoid, delusional. reported CAH to harm self.     PLAN  >would benefit from 9.13 vol admit  >c/w constant observation for safety.   >Psych meds:  D/C invega  Increase Zyprexa to 15mg  Goal of VILLANUEVA for safety and compliance given hx of noncompliance and suicide attempts with poor insight  Consider start of lithium  Continue Zoloft 75mg daily  >PRNs:  Zyprexa 5 IM and PO, neurontin 300 QID PRN  Comorbids: overweight, none other known  NUT: BMI/height and weights, NUT consult  Family collateral  Psych collateral  G&M therapy  Dispo TBD-- consider PHP

## 2024-02-02 PROCEDURE — 99232 SBSQ HOSP IP/OBS MODERATE 35: CPT

## 2024-02-02 RX ADMIN — SERTRALINE 75 MILLIGRAM(S): 25 TABLET, FILM COATED ORAL at 08:28

## 2024-02-02 RX ADMIN — Medication 1 MILLIGRAM(S): at 20:46

## 2024-02-02 RX ADMIN — OLANZAPINE 15 MILLIGRAM(S): 15 TABLET, FILM COATED ORAL at 20:06

## 2024-02-02 NOTE — BH INPATIENT PSYCHIATRY PROGRESS NOTE - PRN MEDS
MEDICATIONS  (PRN):  artificial  tears Solution 1 Drop(s) Left EYE three times a day PRN dry eyes  gabapentin 300 milliGRAM(s) Oral four times a day PRN anxiety  LORazepam     Tablet 1 milliGRAM(s) Oral every 6 hours PRN Anxiety/Agitation  LORazepam   Injectable 1 milliGRAM(s) IntraMuscular once PRN Severe anxiety/Agitation  melatonin. 5 milliGRAM(s) Oral at bedtime PRN Insomnia  OLANZapine 5 milliGRAM(s) Oral every 4 hours PRN agitation or aggression  OLANZapine Injectable 5 milliGRAM(s) IntraMuscular once PRN agitation or aggression

## 2024-02-02 NOTE — BH INPATIENT PSYCHIATRY PROGRESS NOTE - NSBHATTESTCOMMENTATTENDFT_PSY_A_CORE
Care was discussed and reviewed in interdisciplinary treatment team.  I, Brittni Pennington MD, have reviewed and verified the documentation.  I independently performed the documented medical decision making. Care was discussed and reviewed in interdisciplinary treatment team.  I, Brittni Pennington MD, have reviewed and verified the documentation.  I independently performed the documented medical decision making.    Patient was evaluated with the NP and med student. She remains internally focus and experiencing AH. Patient looks depressed and she is maintaining poor eye contact.

## 2024-02-02 NOTE — BH INPATIENT PSYCHIATRY PROGRESS NOTE - NSBHFUPINTERVALHXFT_PSY_A_CORE
Patient was seen for f/u for Depression and psychosis. Chart reviewed and case discussed in team meeting. Patient reported she feels "better" and describes her mood as "calm". She does not have passive thoughts of harming herself or others. She continues to hear voices but they are the members of her family and no longer reports hearing commands to hurt herself. She does not report to have any visual hallucinations. She does not want to remain on CO but patient remains on CO 1:1 for SI.  Patient describes her main stressor to be finances. She is concerned about the "lack of stability" because she continues to "start things" and "never finishes". She does not know how she will return home and return back to her old life. Patient reports she had a good visit with her son Glen last night. She reports feeling sleepy with her increased dose of Xyprexa (15mg) and agrees with another increase in Xyprexa over the weekend.    Patient was seen for f/u for Depression and psychosis. Chart reviewed and case discussed in team meeting. Patient reported she feels "better" and describes her mood as "calm". She does not have passive thoughts of harming herself or others. She continues to hear voices but they are the members of her family and no longer reports hearing commands to hurt herself. She does not report to have any visual hallucinations. She does not want to remain on CO but patient remains on CO 1:1 for SI.  Patient describes her main stressor to be finances. She is concerned about the "lack of stability" because she continues to "start things" and "never finishes". She does not know how she will return home and return back to her old life. Patient reports she had a good visit with her son Glen last night. She reports feeling sleepy with her increased dose of zyprexa (15mg) and agrees with another increase in zyprexa over the weekend.

## 2024-02-02 NOTE — BH INPATIENT PSYCHIATRY PROGRESS NOTE - CURRENT MEDICATION
MEDICATIONS  (STANDING):  OLANZapine 15 milliGRAM(s) Oral at bedtime  sertraline 25 milliGRAM(s) Oral once  sertraline 75 milliGRAM(s) Oral daily    MEDICATIONS  (PRN):  artificial  tears Solution 1 Drop(s) Left EYE three times a day PRN dry eyes  gabapentin 300 milliGRAM(s) Oral four times a day PRN anxiety  LORazepam     Tablet 1 milliGRAM(s) Oral every 6 hours PRN Anxiety/Agitation  LORazepam   Injectable 1 milliGRAM(s) IntraMuscular once PRN Severe anxiety/Agitation  melatonin. 5 milliGRAM(s) Oral at bedtime PRN Insomnia  OLANZapine 5 milliGRAM(s) Oral every 4 hours PRN agitation or aggression  OLANZapine Injectable 5 milliGRAM(s) IntraMuscular once PRN agitation or aggression   MEDICATIONS  (STANDING):  OLANZapine 15 milliGRAM(s) Oral at bedtime  sertraline 75 milliGRAM(s) Oral daily  sertraline 25 milliGRAM(s) Oral once    MEDICATIONS  (PRN):  artificial  tears Solution 1 Drop(s) Left EYE three times a day PRN dry eyes  gabapentin 300 milliGRAM(s) Oral four times a day PRN anxiety  LORazepam     Tablet 1 milliGRAM(s) Oral every 6 hours PRN Anxiety/Agitation  LORazepam   Injectable 1 milliGRAM(s) IntraMuscular once PRN Severe anxiety/Agitation  melatonin. 5 milliGRAM(s) Oral at bedtime PRN Insomnia  OLANZapine 5 milliGRAM(s) Oral every 4 hours PRN agitation or aggression  OLANZapine Injectable 5 milliGRAM(s) IntraMuscular once PRN agitation or aggression

## 2024-02-02 NOTE — BH INPATIENT PSYCHIATRY PROGRESS NOTE - NSBHASSESSSUMMFT_PSY_ALL_CORE
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 She was stabilized on Olanzapine 5mg and Sertraline 100mg daily but did not follow up with treatment after discharge (referred to [facility]). She has been taking d/c medication sporadically and last took Olanzapine 5mg 2m ago and sertraline 100mg 2 weeks ago. She recently started treatment at [facility] for psychotherapy with plans for weekly or 2x week visits, denies substance use, denies hx of violence or aggression, no legal hx, no access to firearms, presents to [facility] seeking assessment for worsened symptoms of depression and psychosis. At the time of assessment, clinical picture is significant for depression with psychosis. She presents as anxious and distressed with ongoing paranoia and SI to OD. She is unable to safety plan in the community and clinically appropriate for admission. Agrees to VOL admit, and to work with day team, also trial of invega. Defer on all ssris/snris for now given unclear dx. Goal of VILLANUEVA for safety and compliance. R/o BAD, r/o SAD, r/o SCZ    On assessment today, patient has improved with no more suicidal intent but is still depressed, distressed, despondent, delusional.    PLAN  >would benefit from 9.13 vol admit  >c/w constant observation for safety.   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 She was stabilized on Olanzapine 5mg and Sertraline 100mg daily but did not follow up with treatment after discharge (referred to [facility]). She has been taking d/c medication sporadically and last took Olanzapine 5mg 2m ago and sertraline 100mg 2 weeks ago. She recently started treatment at [facility] for psychotherapy with plans for weekly or 2x week visits, denies substance use, denies hx of violence or aggression, no legal hx, no access to firearms, presents to [facility] seeking assessment for worsened symptoms of depression and psychosis. At the time of assessment, clinical picture is significant for depression with psychosis. She presents as anxious and distressed with ongoing paranoia and SI to OD. She is unable to safety plan in the community and clinically appropriate for admission. Agrees to VOL admit, and to work with day team, also trial of invega. Defer on all ssris/snris for now given unclear dx. Goal of VILLANUEVA for safety and compliance. R/o BAD, r/o SAD, r/o SCZ    On assessment today, patient is guarded, internally preoccupied, distracted, w/ AH, command at times, remains depressed, distressed, despondent, delusional.    PLAN  >would benefit from 9.13 vol admit  >c/w constant observation for safety.   >Psych meds:  D/C invega  Continue Zyprexa 15mg with plans to increase to 20mg on 2/3  Goal of VILLANUEVA for safety and compliance given hx of noncompliance and suicide attempts with poor insight  Consider start of lithium  Continue Zoloft 75mg daily  >PRNs:  Zyprexa 5 IM and PO, neurontin 300 QID PRN  Comorbids: overweight, none other known  NUT: BMI/height and weights, NUT consult  Family collateral  Psych collateral  G&M therapy  Dispo TBD-- consider PHP

## 2024-02-02 NOTE — BH INPATIENT PSYCHIATRY PROGRESS NOTE - NSBHCHARTREVIEWVS_PSY_A_CORE FT
Vital Signs Last 24 Hrs  T(C): --  T(F): --  HR: --  BP: --  BP(mean): --  RR: --  SpO2: --    Orthostatic VS  02-01-24 @ 07:26  Lying BP: --/-- HR: --  Sitting BP: 127/74 HR: 93  Standing BP: 126/75 HR: 103  Site: --  Mode: --

## 2024-02-02 NOTE — BH INPATIENT PSYCHIATRY PROGRESS NOTE - NSBHMETABOLIC_PSY_ALL_CORE_FT
BMI: BMI (kg/m2): 28 (01-25-24 @ 19:01)  HbA1c: A1C with Estimated Average Glucose Result: 5.7 % (01-26-24 @ 08:30)    Glucose:   BP: --Vital Signs Last 24 Hrs  T(C): --  T(F): --  HR: --  BP: --  BP(mean): --  RR: --  SpO2: --    Orthostatic VS  02-01-24 @ 07:26  Lying BP: --/-- HR: --  Sitting BP: 127/74 HR: 93  Standing BP: 126/75 HR: 103  Site: --  Mode: --    Lipid Panel: Date/Time: 01-26-24 @ 08:30  Cholesterol, Serum: 209  LDL Cholesterol Calculated: 120  HDL Cholesterol, Serum: 79  Total Cholesterol/HDL Ration Measurement: --  Triglycerides, Serum: 51

## 2024-02-03 PROCEDURE — 99232 SBSQ HOSP IP/OBS MODERATE 35: CPT

## 2024-02-03 RX ADMIN — OLANZAPINE 15 MILLIGRAM(S): 15 TABLET, FILM COATED ORAL at 21:34

## 2024-02-03 RX ADMIN — SERTRALINE 75 MILLIGRAM(S): 25 TABLET, FILM COATED ORAL at 09:22

## 2024-02-03 NOTE — BH INPATIENT PSYCHIATRY PROGRESS NOTE - NSBHASSESSSUMMFT_PSY_ALL_CORE
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 from 5/26 to 6/6/23 for SI and depression, 2 prior SA – 1st age [age]yo by OD and was held overnight at a hospital, most recent in May 2023  where she attempted to cut her wrist and took ½ bottle of NyQuil prior to her admission. She was stabilized on Olanzapine 5mg and Sertraline 100mg daily but did not follow up with treatment after discharge (referred to [facility]). She has been taking d/c medication sporadically and last took Olanzapine 5mg 2m ago and sertraline 100mg 2 weeks ago. She recently started treatment at [facility] for psychotherapy with plans for weekly or 2x week visits, denies substance use, denies hx of violence or aggression, no legal hx, no access to firearms, presents to [facility] seeking assessment for worsened symptoms of depression and psychosis. At the time of assessment, clinical picture is significant for depression with psychosis. She presents as anxious and distressed with ongoing paranoia and SI to OD. She is unable to safety plan in the community and clinically appropriate for admission. Agrees to VOL admit, and to work with day team, also trial of invega. Defer on all ssris/snris for now given unclear dx. Goal of VILLANUEVA for safety and compliance. R/o BAD, r/o SAD, r/o SCZ    no AVH on interview today. fleeting and passive SI as recent as last night. will maintain pt on CO. maintain regimen as below     PLAN  >would benefit from 9.13 vol admit  >c/w constant observation for safety.   >Psych meds:  D/C invega  Continue Zyprexa 15mg with plans to increase to 20mg on 2/3  Goal of VILLANUEVA for safety and compliance given hx of noncompliance and suicide attempts with poor insight  Consider start of lithium  Continue Zoloft 75mg daily  >PRNs:  Zyprexa 5 IM and PO, neurontin 300 QID PRN  Comorbids: overweight, none other known  NUT: BMI/height and weights, NUT consult  Family collateral  Psych collateral  G&M therapy  Dispo TBD-- consider PHP

## 2024-02-03 NOTE — BH INPATIENT PSYCHIATRY PROGRESS NOTE - CURRENT MEDICATION
MEDICATIONS  (STANDING):  OLANZapine 15 milliGRAM(s) Oral at bedtime  sertraline 75 milliGRAM(s) Oral daily  sertraline 25 milliGRAM(s) Oral once    MEDICATIONS  (PRN):  artificial  tears Solution 1 Drop(s) Left EYE three times a day PRN dry eyes  gabapentin 300 milliGRAM(s) Oral four times a day PRN anxiety  LORazepam     Tablet 1 milliGRAM(s) Oral every 6 hours PRN Anxiety/Agitation  LORazepam   Injectable 1 milliGRAM(s) IntraMuscular once PRN Severe anxiety/Agitation  melatonin. 5 milliGRAM(s) Oral at bedtime PRN Insomnia  OLANZapine 5 milliGRAM(s) Oral every 4 hours PRN agitation or aggression  OLANZapine Injectable 5 milliGRAM(s) IntraMuscular once PRN agitation or aggression   Normal for race

## 2024-02-03 NOTE — BH INPATIENT PSYCHIATRY PROGRESS NOTE - NSBHFUPINTERVALHXFT_PSY_A_CORE
chart reviewed including pertinent labs. Case discussed with nursing staff. patient has no new complaints. she feels depression slowly improving; she is having fleeting and passive SI most recently last night. she reports feeling "groggy" attributes it to medications wants to remain on current dose. no avh

## 2024-02-03 NOTE — BH INPATIENT PSYCHIATRY PROGRESS NOTE - NSBHCHARTREVIEWVS_PSY_A_CORE FT
Vital Signs Last 24 Hrs  T(C): 37 (02-03-24 @ 07:47), Max: 37 (02-03-24 @ 07:47)  T(F): 98.6 (02-03-24 @ 07:47), Max: 98.6 (02-03-24 @ 07:47)  HR: --  BP: --  BP(mean): --  RR: 19 (02-03-24 @ 07:47) (19 - 19)  SpO2: 99% (02-03-24 @ 07:47) (99% - 99%)    Orthostatic VS  02-03-24 @ 07:47  Lying BP: --/-- HR: --  Sitting BP: 137/84 HR: 100  Standing BP: 127/83 HR: 100  Site: --  Mode: --  Orthostatic VS  02-02-24 @ 09:26  Lying BP: --/-- HR: --  Sitting BP: 131/77 HR: 78  Standing BP: 133/76 HR: 80  Site: --  Mode: --

## 2024-02-03 NOTE — BH INPATIENT PSYCHIATRY PROGRESS NOTE - NSBHMETABOLIC_PSY_ALL_CORE_FT
BMI: BMI (kg/m2): 28 (01-25-24 @ 19:01)  HbA1c: A1C with Estimated Average Glucose Result: 5.7 % (01-26-24 @ 08:30)    Glucose:   BP: --Vital Signs Last 24 Hrs  T(C): 37 (02-03-24 @ 07:47), Max: 37 (02-03-24 @ 07:47)  T(F): 98.6 (02-03-24 @ 07:47), Max: 98.6 (02-03-24 @ 07:47)  HR: --  BP: --  BP(mean): --  RR: 19 (02-03-24 @ 07:47) (19 - 19)  SpO2: 99% (02-03-24 @ 07:47) (99% - 99%)    Orthostatic VS  02-03-24 @ 07:47  Lying BP: --/-- HR: --  Sitting BP: 137/84 HR: 100  Standing BP: 127/83 HR: 100  Site: --  Mode: --  Orthostatic VS  02-02-24 @ 09:26  Lying BP: --/-- HR: --  Sitting BP: 131/77 HR: 78  Standing BP: 133/76 HR: 80  Site: --  Mode: --    Lipid Panel: Date/Time: 01-26-24 @ 08:30  Cholesterol, Serum: 209  LDL Cholesterol Calculated: 120  HDL Cholesterol, Serum: 79  Total Cholesterol/HDL Ration Measurement: --  Triglycerides, Serum: 51

## 2024-02-04 PROCEDURE — 99231 SBSQ HOSP IP/OBS SF/LOW 25: CPT

## 2024-02-04 RX ADMIN — OLANZAPINE 15 MILLIGRAM(S): 15 TABLET, FILM COATED ORAL at 21:03

## 2024-02-04 RX ADMIN — SERTRALINE 75 MILLIGRAM(S): 25 TABLET, FILM COATED ORAL at 10:01

## 2024-02-04 NOTE — BH INPATIENT PSYCHIATRY PROGRESS NOTE - NSBHFUPINTERVALHXFT_PSY_A_CORE
chart reviewed including pertinent labs. Case discussed with nursing staff. subjective improvements in mood; she feels somewhat somnolent this AM. she notes last fleeting and passive SI was friday evening. she reports AH most recently yesterday afternoon non-command type and contents including saying "stop," and negative thoughts. she attributes mood improvements to medications

## 2024-02-04 NOTE — BH INPATIENT PSYCHIATRY PROGRESS NOTE - NSBHMETABOLIC_PSY_ALL_CORE_FT
BMI: BMI (kg/m2): 28 (01-25-24 @ 19:01)  HbA1c: A1C with Estimated Average Glucose Result: 5.7 % (01-26-24 @ 08:30)    Glucose:   BP: --Vital Signs Last 24 Hrs  T(C): 36.8 (02-04-24 @ 07:24), Max: 36.8 (02-04-24 @ 07:24)  T(F): 98.3 (02-04-24 @ 07:24), Max: 98.3 (02-04-24 @ 07:24)  HR: --  BP: --  BP(mean): --  RR: 19 (02-04-24 @ 07:24) (19 - 19)  SpO2: 99% (02-04-24 @ 07:24) (99% - 99%)    Orthostatic VS  02-04-24 @ 07:24  Lying BP: --/-- HR: --  Sitting BP: 149/64 HR: 89  Standing BP: 139/74 HR: 98  Site: --  Mode: --  Orthostatic VS  02-03-24 @ 07:47  Lying BP: --/-- HR: --  Sitting BP: 137/84 HR: 100  Standing BP: 127/83 HR: 100  Site: --  Mode: --    Lipid Panel: Date/Time: 01-26-24 @ 08:30  Cholesterol, Serum: 209  LDL Cholesterol Calculated: 120  HDL Cholesterol, Serum: 79  Total Cholesterol/HDL Ration Measurement: --  Triglycerides, Serum: 51

## 2024-02-04 NOTE — BH INPATIENT PSYCHIATRY PROGRESS NOTE - NSBHCHARTREVIEWVS_PSY_A_CORE FT
Vital Signs Last 24 Hrs  T(C): 36.8 (02-04-24 @ 07:24), Max: 36.8 (02-04-24 @ 07:24)  T(F): 98.3 (02-04-24 @ 07:24), Max: 98.3 (02-04-24 @ 07:24)  HR: --  BP: --  BP(mean): --  RR: 19 (02-04-24 @ 07:24) (19 - 19)  SpO2: 99% (02-04-24 @ 07:24) (99% - 99%)    Orthostatic VS  02-04-24 @ 07:24  Lying BP: --/-- HR: --  Sitting BP: 149/64 HR: 89  Standing BP: 139/74 HR: 98  Site: --  Mode: --  Orthostatic VS  02-03-24 @ 07:47  Lying BP: --/-- HR: --  Sitting BP: 137/84 HR: 100  Standing BP: 127/83 HR: 100  Site: --  Mode: --

## 2024-02-04 NOTE — BH INPATIENT PSYCHIATRY PROGRESS NOTE - ORIENTATION
not fully able to endorse being delusional

## 2024-02-04 NOTE — BH INPATIENT PSYCHIATRY PROGRESS NOTE - CURRENT MEDICATION
MEDICATIONS  (STANDING):  OLANZapine 15 milliGRAM(s) Oral at bedtime  sertraline 75 milliGRAM(s) Oral daily  sertraline 25 milliGRAM(s) Oral once    MEDICATIONS  (PRN):  artificial  tears Solution 1 Drop(s) Left EYE three times a day PRN dry eyes  gabapentin 300 milliGRAM(s) Oral four times a day PRN anxiety  LORazepam     Tablet 1 milliGRAM(s) Oral every 6 hours PRN Anxiety/Agitation  LORazepam   Injectable 1 milliGRAM(s) IntraMuscular once PRN Severe anxiety/Agitation  melatonin. 5 milliGRAM(s) Oral at bedtime PRN Insomnia  OLANZapine 5 milliGRAM(s) Oral every 4 hours PRN agitation or aggression  OLANZapine Injectable 5 milliGRAM(s) IntraMuscular once PRN agitation or aggression

## 2024-02-04 NOTE — BH INPATIENT PSYCHIATRY PROGRESS NOTE - NSBHASSESSSUMMFT_PSY_ALL_CORE
ASSESSMENT: Patient is a 58-year-old female, single, domiciled with son, has 2 adult son, employed house cleaning and [employer] , PMH: hx of knee surgery, dx with depression with psychosis, 1 prior admission at [facility] from 5/26 to 6/6/23 for SI and depression, 2 prior SA – 1st age [age]yo by OD and was held overnight at a hospital, most recent in May 2023  where she attempted to cut her wrist and took ½ bottle of NyQuil prior to her admission. She was stabilized on Olanzapine 5mg and Sertraline 100mg daily but did not follow up with treatment after discharge (referred to [facility]). She has been taking d/c medication sporadically and last took Olanzapine 5mg 2m ago and sertraline 100mg 2 weeks ago. She recently started treatment at [facility] for psychotherapy with plans for weekly or 2x week visits, denies substance use, denies hx of violence or aggression, no legal hx, no access to firearms, presents to [facility] seeking assessment for worsened symptoms of depression and psychosis. At the time of assessment, clinical picture is significant for depression with psychosis. She presents as anxious and distressed with ongoing paranoia and SI to OD. She is unable to safety plan in the community and clinically appropriate for admission    subjective improvements in mood, no SI, intermittent non-command type AH. will renew CO. med plan as below     PLAN  >would benefit from 9.13 vol admit  >c/w constant observation for safety.   >Psych meds:  D/C invega  Continue Zyprexa 15mg with plans to increase to 20mg on 2/3  Goal of VILLANUEVA for safety and compliance given hx of noncompliance and suicide attempts with poor insight  Consider start of lithium  Continue Zoloft 75mg daily  >PRNs:  Zyprexa 5 IM and PO, neurontin 300 QID PRN  Comorbids: overweight, none other known  NUT: BMI/height and weights, NUT consult  Family collateral  Psych collateral  G&M therapy  Dispo TBD-- consider PHP

## 2024-02-04 NOTE — BH INPATIENT PSYCHIATRY PROGRESS NOTE - NSBHPSYCHOLCOGABN_PSY_A_CORE
disoriented to situation

## 2024-02-05 PROCEDURE — 99232 SBSQ HOSP IP/OBS MODERATE 35: CPT

## 2024-02-05 RX ADMIN — SERTRALINE 75 MILLIGRAM(S): 25 TABLET, FILM COATED ORAL at 09:10

## 2024-02-05 RX ADMIN — OLANZAPINE 15 MILLIGRAM(S): 15 TABLET, FILM COATED ORAL at 20:20

## 2024-02-05 RX ADMIN — Medication 5 MILLIGRAM(S): at 22:27

## 2024-02-05 NOTE — BH INPATIENT PSYCHIATRY PROGRESS NOTE - NSBHATTESTBILLING_PSY_A_CORE
67943-[id number] OBS or IP - low complexity OR 25-34 mins 09472-Djkunsinav OBS or IP - moderate complexity OR 35-49 mins

## 2024-02-05 NOTE — BH INPATIENT PSYCHIATRY PROGRESS NOTE - NSBHFUPINTERVALHXFT_PSY_A_CORE
Chart reviewed including pertinent labs. Case discussed with nursing staff. She describe her mood as "stable" and "good". She reports sleeping well and having more interest in activities such as word puzzles, coloring, and bracelet making. She still has some fleeting thoughts of harming herself but is now able to brush it off. She reports that meditition has helped her and thinking that "this is not who I am". She feels she can come to the staff to ask for help if she needs it. She no longer hears any voices and has no visual hallucination. She does not feel like the TV is talking to her. Both her sons visited over the weekend and the visits went well. Chart reviewed including pertinent labs. Case discussed with nursing staff. She describe her mood as "stable" and "good". She reports sleeping well and having more interest in activities such as word puzzles, coloring, and bracelet making. She still has some fleeting thoughts of harming herself but is now able to brush it off, distract self. She reports that meditation has helped her and thinking that "this is not who I am". Patient denied SIIP, was able to contract for safety, feels she can come to the staff to ask for help if she needs it, CO 1:1 was discontinued. She no longer hears any voices and has no visual hallucination. She no longer feels the TV is sending messages to her. Both her sons visited over the weekend and the visits went well.

## 2024-02-05 NOTE — BH INPATIENT PSYCHIATRY PROGRESS NOTE - NSBHCHARTREVIEWVS_PSY_A_CORE FT
Vital Signs Last 24 Hrs  T(C): 36.7 (02-05-24 @ 08:49), Max: 36.7 (02-05-24 @ 08:49)  T(F): 98 (02-05-24 @ 08:49), Max: 98 (02-05-24 @ 08:49)  HR: --  BP: --  BP(mean): --  RR: 18 (02-05-24 @ 08:49) (18 - 18)  SpO2: 99% (02-05-24 @ 08:49) (99% - 99%)    Orthostatic VS  02-05-24 @ 08:49  Lying BP: --/-- HR: --  Sitting BP: 129/74 HR: 96  Standing BP: 118/68 HR: 100  Site: --  Mode: --  Orthostatic VS  02-04-24 @ 07:24  Lying BP: --/-- HR: --  Sitting BP: 149/64 HR: 89  Standing BP: 139/74 HR: 98  Site: --  Mode: --

## 2024-02-05 NOTE — BH INPATIENT PSYCHIATRY PROGRESS NOTE - NSBHASSESSSUMMFT_PSY_ALL_CORE
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 She was stabilized on Olanzapine 5mg and Sertraline 100mg daily but did not follow up with treatment after discharge (referred to [facility]). She has been taking d/c medication sporadically and last took Olanzapine 5mg 2m ago and sertraline 100mg 2 weeks ago. She recently started treatment at [facility] for psychotherapy with plans for weekly or 2x week visits, denies substance use, denies hx of violence or aggression, no legal hx, no access to firearms, presents to [facility] seeking assessment for worsened symptoms of depression and psychosis. At the time of assessment, clinical picture is improving.     subjective improvements in mood, no SI, intermittent non-command type AH. will renew CO. med plan as below     PLAN  >would benefit from 9.13 vol admit  >c/w constant observation for safety.   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 She was stabilized on Olanzapine 5mg and Sertraline 100mg daily but did not follow up with treatment after discharge (referred to [facility]). She has been taking d/c medication sporadically and last took Olanzapine 5mg 2m ago and sertraline 100mg 2 weeks ago. She recently started treatment at [facility] for psychotherapy with plans for weekly or 2x week visits, denies substance use, denies hx of violence or aggression, no legal hx, no access to firearms, presents to [facility] seeking assessment for worsened symptoms of depression and psychosis. At the time of assessment, clinical picture is improving.     subjective improvements in mood, fleeting SI, no intent or plan, able to contract for safety, med plan as below     PLAN  >would benefit from 9.13 vol admit  >c/w constant observation for safety.   >Psych meds:  D/C invega  Continue Zyprexa 15mg  Goal of VILLANUEVA for safety and compliance given hx of noncompliance and suicide attempts with poor insight  Consider start of lithium  Continue Zoloft 75mg daily  >PRNs:  Zyprexa 5 IM and PO, neurontin 300 QID PRN  Comorbids: overweight, none other known  NUT: BMI/height and weights, NUT consult  Family collateral  Psych collateral  G&M therapy  Dispo TBD-- consider PHP

## 2024-02-05 NOTE — BH INPATIENT PSYCHIATRY PROGRESS NOTE - NSBHMETABOLIC_PSY_ALL_CORE_FT
BMI: BMI (kg/m2): 28 (01-25-24 @ 19:01)  HbA1c: A1C with Estimated Average Glucose Result: 5.7 % (01-26-24 @ 08:30)    Glucose:   BP: --Vital Signs Last 24 Hrs  T(C): 36.7 (02-05-24 @ 08:49), Max: 36.7 (02-05-24 @ 08:49)  T(F): 98 (02-05-24 @ 08:49), Max: 98 (02-05-24 @ 08:49)  HR: --  BP: --  BP(mean): --  RR: 18 (02-05-24 @ 08:49) (18 - 18)  SpO2: 99% (02-05-24 @ 08:49) (99% - 99%)    Orthostatic VS  02-05-24 @ 08:49  Lying BP: --/-- HR: --  Sitting BP: 129/74 HR: 96  Standing BP: 118/68 HR: 100  Site: --  Mode: --  Orthostatic VS  02-04-24 @ 07:24  Lying BP: --/-- HR: --  Sitting BP: 149/64 HR: 89  Standing BP: 139/74 HR: 98  Site: --  Mode: --    Lipid Panel: Date/Time: 01-26-24 @ 08:30  Cholesterol, Serum: 209  LDL Cholesterol Calculated: 120  HDL Cholesterol, Serum: 79  Total Cholesterol/HDL Ration Measurement: --  Triglycerides, Serum: 51

## 2024-02-06 PROCEDURE — 99232 SBSQ HOSP IP/OBS MODERATE 35: CPT

## 2024-02-06 RX ADMIN — Medication 5 MILLIGRAM(S): at 21:42

## 2024-02-06 RX ADMIN — SERTRALINE 75 MILLIGRAM(S): 25 TABLET, FILM COATED ORAL at 09:52

## 2024-02-06 RX ADMIN — OLANZAPINE 15 MILLIGRAM(S): 15 TABLET, FILM COATED ORAL at 20:49

## 2024-02-06 NOTE — BH TREATMENT PLAN - NSTXDCOPNOPROGRES_PSY_ALL_CORE
CC:  Santi Coates is a 57 year old male who presents for a yearly CPE    HPI: Health Maintenance.  Patient is here for yearly physical examination.  Patient is establishing care.  He is up-to-date on colon cancer screening.  He does have a history of colon polyps.  He is up-to-date on tetanus.    Ankle Pain.  Patient does have chronic right ankle pain.  This has been an issue for the past couple years.  He has seen an orthopedic surgeon in his hometown.  He was told that he may be a surgical candidate.  He would like to establish with the surgeon here to further pursue this.    Knee Pain.  Patient does have osteoarthritis of his left knee.  He has seen orthopedic surgeon previously.  He was also told that surgery may benefit and help with his pain.  He would like to establish with an orthopedic surgeon here.    Hyperlipidemia.  Patient recently had a screening lab draw of cholesterol and glucose through his employer.  Both of these came back markedly abnormal.  He was on atorvastatin 1 year ago and discontinued.  His fasting glucose was 120.  He is very concerned about this would like to consider treatments.    No other concerns at this time.        Past Medical History:   Diagnosis Date   • Chronic pain of right ankle 1/31/2020   • Hyperlipidemia 3/27/2017   • Osteoarthritis    • Primary osteoarthritis of left knee 3/27/2017       Past Surgical History:   Procedure Laterality Date   • Fracture surgery  05/2018    on right ankle   • Knee arthroscopy w/ laser Right 2014        ALLERGIES:   Allergen Reactions   • Compazine Muscle Spasm       Current Outpatient Medications   Medication Sig Dispense Refill   • ibuprofen (MOTRIN) 600 MG tablet      • acetaminophen (TYLENOL ARTHRITIS PAIN) 650 MG CR tablet Take 650 mg by mouth 2 times daily.       No current facility-administered medications for this visit.         Most Recent Immunizations   Administered Date(s) Administered   • Influenza, seasonal, injectable,  preservative free 10/27/2008   • Influenza, seasonal, injectable, trivalent 10/18/2007   • Td:Adult type tetanus/diphtheria 2014       Family History   Problem Relation Age of Onset   • Diabetes Mother    • High blood pressure Mother    • Heart disease Father    • High blood pressure Father    • Heart disease Paternal Grandfather        Family Status   Relation Name Status   • Mo   at age 79        Renal Failure   • Fa   at age 58        MI   • PGFa  (Not Specified)       Social History     Socioeconomic History   • Marital status: /Civil Union     Spouse name: Juli   • Number of children: 3   • Years of education: Not on file   • Highest education level: Not on file   Occupational History   • Occupation:    Social Needs   • Financial resource strain: Not on file   • Food insecurity:     Worry: Not on file     Inability: Not on file   • Transportation needs:     Medical: Not on file     Non-medical: Not on file   Tobacco Use   • Smoking status: Never Smoker   • Smokeless tobacco: Never Used   Substance and Sexual Activity   • Alcohol use: No   • Drug use: No   • Sexual activity: Not on file   Lifestyle   • Physical activity:     Days per week: Not on file     Minutes per session: Not on file   • Stress: Not on file   Relationships   • Social connections:     Talks on phone: Not on file     Gets together: Not on file     Attends Christianity service: Not on file     Active member of club or organization: Not on file     Attends meetings of clubs or organizations: Not on file     Relationship status: Not on file   • Intimate partner violence:     Fear of current or ex partner: Not on file     Emotionally abused: Not on file     Physically abused: Not on file     Forced sexual activity: Not on file   Other Topics Concern   • Not on file   Social History Narrative   • Not on file        Recent PHQ 2/9 Score    PHQ 2:  Date Adult PHQ 2 Score   2020 0       PHQ 9:  Date  Adult PHQ 9 Score   8/31/2018 13       DEPRESSION ASSESSMENT/PLAN:  Depression screening is negative no further plan needed.      ROS: The patient denies symptoms of:     General: fever, chills, night sweats, fatigue, weight loss, weight gain and decreased appetite  Skin: rash, itching, lumps or bumps or moles that are changing, hair changes and nail changes  Eyes: visual blurring, double vision, spots before the eyes and eye pain.  Wears glasses.  Ears: decreased auditory acuity, ringing or tinnitus in the ears, pain in the ears and discharge from the ears  Nose: nose bleed, discharge from the nose and decrease in ability to smell  Mouth: soreness of the mouth or tongue or lips, lumps or bumps in the mouth and abnormality of the teeth or gums  Throat: sore throat and hoarseness or voice change  Neck: swelling or masses in the neck, stiffness or pain in the neck and limited range of motion in the neck  Cardiorespiratory: chest pain, edema, cough, hemoptysis, wheeze and shortness of breath  Gastrointestinal: abdominal pain, nausea, vomiting, constipation, diarrhea, black tarry stools, blood in the stools, change in the bowel habits, sour burps or heart burn and indigestion  Genitourinary: urgency, frequency, dysuria, hematuria and <<PATIENT DOES HAVE SYMPTOMS OF>> Nocturia x 1  Neurologic: headache, weakness, loss of sensation, visual disturbance, trouble with balance or coordination and loss of memory  Musculoskeletal: joint stiffness, joint pain, joint swelling and as noted above  Psychiatric: symptoms of depression, feeling sad or blue    The 10-year ASCVD risk score (Vernon ASHANTI Jr., et al., 2013) is: 9.8%    Values used to calculate the score:      Age: 57 years      Sex: Male      Is Non- : No      Diabetic: No      Tobacco smoker: No      Systolic Blood Pressure: 132 mmHg      Is BP treated: No      HDL Cholesterol: 35 mg/dL      Total Cholesterol: 193 mg/dL    PHYSICAL EXAM:    VITAL  SIGNS:   Visit Vitals  /72   Pulse 85   Ht 5' 11\" (1.803 m)   Wt 132.9 kg   SpO2 99%   BMI 40.87 kg/m²   ; Estimated body mass index is 40.87 kg/m² as calculated from the following:    Height as of this encounter: 5' 11\" (1.803 m).    Weight as of this encounter: 132.9 kg.       Wt Readings from Last 4 Encounters:   01/31/20 132.9 kg   09/25/18 126.2 kg   08/31/18 127.1 kg   11/13/17 127 kg         GENERAL APPEARANCE: appears stated age, is in no apparent distress and is well developed and well nourished  SKIN: normal color, normal texture, normal turgor, no skin rashes, no atypical appearing skin lesions and no bruises  HEAD: normocephalic and non-tender  EYES: pupils are equal and reactive to light and accommodation extraocular movements are full sclerae and conjunctivae are normal lids and lashes are normal   EARS: pinna and external ear is normal bilaterally, external auditory canals are normal, tympanic membranes are normal, middle ear space is normal bilaterally, there is no discomfort on traction of the pinna or palpation of the tragus and auditory acuity is grossly normal  NOSE: external nose is normal to inspection, no septal deviation and anterior nares are normal  MOUTH/THROAT: tongue is midline and appears normal, oropharynx appears normal, soft palate and uvula are normal and oral mucosa is normal  NECK: neck is supple, no thyromegaly, no anterior cervical adenopathy, no posterior cervical adenopathy and no supraclavicular adenopathy  CHEST: contour is normal with normal AP diameter, normal respiratory excursion, respiratory effort is not labored and no chest wall discomfort to palpation  LUNGS: lungs are clear to auscultation with normal inspiratory/expiratory sounds, no rales, no rhonchi and no wheezes  HEART: normal PMI, normal rate and rhythm, S1 and S2 normal, no S3 or S4, no murmurs and no extra heart sounds  ABDOMEN: abdomen is soft, normal active bowel sounds, nontender, without masses,  without hepatomegaly and without splenomegaly  BACK: inspection shows no curvature, no discomfort to palpation of the midline and no costovertebral angle discomfort to palpation  EXTREMITIES: no clubbing, no cyanosis, no edema and normal muscle tone and development bilaterally  NEUROLOGIC: motor strength normal, gait and station normal, coordination normal, DTR's normal and symmetric and no tremor noted        ASSESSMENT:   1. Routine physical examination    2. Lipids abnormal    3. Elevated glucose    4. Chronic pain of right ankle    5. Primary osteoarthritis of left knee        PLAN:   Orders Placed This Encounter   • OFFICE/OUTPT VISIT EST LEVEL IV   • CBC    • CMP   • Lipid Panel with Reflex   • Glycohemoglobin   • SERVICE TO ORTHOPEDICS   • SERVICE TO ORTHOPEDICS   • rosuvastatin (CRESTOR) 10 MG tablet   • metFORMIN (GLUCOPHAGE-XR) 500 MG 24 hr tablet       With regards to the yearly physical examination, he is due for blood work.  We will recheck a CBC, CMP, and lipid panel.  He is up-to-date on all health maintenance.    With regards to his ankle and knee respectively, I recommend referral with orthopedics for further evaluation.    With regards to his recent abnormal lipid panel, I recommend beginning generic rosuvastatin.  In addition, I recommend beginning metformin.  We will recheck an A1c level.    All questions were answered    FOLLOW UP:  3 months.  Sooner if indicated.      Tomas Ayala MD   No Change

## 2024-02-06 NOTE — BH INPATIENT PSYCHIATRY PROGRESS NOTE - NSBHASSESSSUMMFT_PSY_ALL_CORE
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 She was stabilized on Olanzapine 5mg and Sertraline 100mg daily but did not follow up with treatment after discharge (referred to [facility]). She has been taking d/c medication sporadically and last took Olanzapine 5mg 2m ago and sertraline 100mg 2 weeks ago. She recently started treatment at [facility] for psychotherapy with plans for weekly or 2x week visits, denies substance use, denies hx of violence or aggression, no legal hx, no access to firearms, presents to [facility] seeking assessment for worsened symptoms of depression and psychosis. At the time of assessment, clinical picture is improving.     subjective improvements in mood, fleeting SI, no intent or plan, able to contract for safety, med plan as below     PLAN  >would benefit from 9.13 vol admit  >c/w constant observation for safety.   >Psych meds:  D/C invega  Continue Zyprexa 15mg  Goal of VILLANUEVA for safety and compliance given hx of noncompliance and suicide attempts with poor insight  Consider start of lithium  Continue Zoloft 75mg daily  >PRNs:  Zyprexa 5 IM and PO, neurontin 300 QID PRN  Comorbids: overweight, none other known  NUT: BMI/height and weights, NUT consult  Family collateral  Psych collateral  G&M therapy  Dispo TBD-- consider PHP

## 2024-02-06 NOTE — BH TREATMENT PLAN - NSTXPSYCHOINTERPR_PSY_ALL_CORE
The writer established a psychiatric rehabilitation goal for the patient to work on over the next seven days. The patient’s psychiatric rehabilitation goal is to identify 2 coping skills that assist with focus on reality for her psychotic symptoms goal. Over the next seven days, Psychiatric Rehabilitation staff will utilize individual psychotherapy and psychoeducation to assist the patient in reaching her treatment goal. The patient denied SI/HI/AH//VH.
The writer met with the patient to assess progress of their psychiatric rehabilitation goal over the past week. The patient made no improvement towards her goal to identify 2 coping skills that assist with focus on reality for her psychotic symptoms goal. The patient reported taking her medications and meditation as her coping skill. The patient is compliant with her medications, minimally engages with her peers, and has fair ADL’s and behavioral control; the patient is on constant observation for suicidal ideation. Over the next seven days, Psychiatric Rehabilitation staff will continue to provide encouragement, support, psychotherapy, and psychoeducation to assist the patient in the progression of her treatment goal and engagement with activities. The patient denied SI/HI/AH/VH.

## 2024-02-06 NOTE — BH TREATMENT PLAN - NSTXPLANTHERAPYSESSIONSFT_PSY_ALL_CORE
02-01-24  Type of therapy: Other  Type of session: Individual  Level of patient participation: Resistance to participation  Duration of participation: Less than 15 minutes  Therapy conducted by: Psych rehab  Therapy Summary: The writer met with the patient to assess progress of their psychiatric rehabilitation goal over the past week. The patient made no improvement towards her goal to identify 2 coping skills that assist with focus on reality for her psychotic symptoms goal. The patient reported taking her medications and meditation as her coping skill. The patient is compliant with her medications, minimally engages with her peers, and has fair ADL’s and behavioral control; the patient is on constant observation for suicidal ideation. The patient attended 15% of the Psychiatric Rehabilitation groups in the past seven days. Over the next seven days, Psychiatric Rehabilitation staff will continue to provide encouragement, support, psychotherapy, and psychoeducation to assist the patient in the progression of her treatment goal and engagement with activities. The patient denied SI/HI/AH/VH.

## 2024-02-06 NOTE — BH INPATIENT PSYCHIATRY PROGRESS NOTE - NSBHMETABOLIC_PSY_ALL_CORE_FT
BMI: BMI (kg/m2): 28 (01-25-24 @ 19:01)  HbA1c: A1C with Estimated Average Glucose Result: 5.7 % (01-26-24 @ 08:30)    Glucose:   BP: --Vital Signs Last 24 Hrs  T(C): 36.6 (02-06-24 @ 07:34), Max: 36.6 (02-06-24 @ 07:34)  T(F): 97.8 (02-06-24 @ 07:34), Max: 97.8 (02-06-24 @ 07:34)  HR: --  BP: --  BP(mean): --  RR: 17 (02-06-24 @ 07:34) (17 - 17)  SpO2: --    Orthostatic VS  02-06-24 @ 07:34  Lying BP: --/-- HR: --  Sitting BP: 131/70 HR: 73  Standing BP: 115/75 HR: 94  Site: --  Mode: --  Orthostatic VS  02-05-24 @ 08:49  Lying BP: --/-- HR: --  Sitting BP: 129/74 HR: 96  Standing BP: 118/68 HR: 100  Site: --  Mode: --    Lipid Panel: Date/Time: 01-26-24 @ 08:30  Cholesterol, Serum: 209  LDL Cholesterol Calculated: 120  HDL Cholesterol, Serum: 79  Total Cholesterol/HDL Ration Measurement: --  Triglycerides, Serum: 51

## 2024-02-06 NOTE — BH INPATIENT PSYCHIATRY PROGRESS NOTE - NSBHFUPINTERVALHXFT_PSY_A_CORE
Chart reviewed including pertinent labs. Case discussed with nursing staff. Patient reported that she is feeling much better now and denies having any SI. Patient informed me that she is feeling tired with the medications. Patient was educated about the use of Zoloft and Zyprexa including reasons for prescribing this medications, benefits, risk and side effects. I informed the patient that we will continue to monitor her but I do not recommend decreasing the dose as she has started to improve. Patient verbalized good understanding and is in agreement with the plan.     When talking about tentative discharge plans patient has been educated about the PHP vs IOP. Patient said that she is interested in having a family meeting as this decisions depend on her children also. We will review with the SW when we can have the meeting.

## 2024-02-06 NOTE — BH TREATMENT PLAN - NSTXDCOPNOINTERSW_PSY_ALL_CORE
SW will encourage pt to comply with OP MH tx upon d/c to increase likelihood of psychiatric stability in the community.
SW will encourage pt to comply with OP MH tx upon d/c to increase likelihood of psychiatric stability in the community.

## 2024-02-06 NOTE — BH INPATIENT PSYCHIATRY PROGRESS NOTE - PRN MEDS
MEDICATIONS  (PRN):  artificial  tears Solution 1 Drop(s) Left EYE three times a day PRN dry eyes  gabapentin 300 milliGRAM(s) Oral four times a day PRN anxiety  melatonin. 5 milliGRAM(s) Oral at bedtime PRN Insomnia  OLANZapine 5 milliGRAM(s) Oral every 4 hours PRN agitation or aggression  OLANZapine Injectable 5 milliGRAM(s) IntraMuscular once PRN agitation or aggression

## 2024-02-06 NOTE — BH INPATIENT PSYCHIATRY PROGRESS NOTE - CURRENT MEDICATION
MEDICATIONS  (STANDING):  OLANZapine 15 milliGRAM(s) Oral at bedtime  sertraline 25 milliGRAM(s) Oral once  sertraline 75 milliGRAM(s) Oral daily    MEDICATIONS  (PRN):  artificial  tears Solution 1 Drop(s) Left EYE three times a day PRN dry eyes  gabapentin 300 milliGRAM(s) Oral four times a day PRN anxiety  melatonin. 5 milliGRAM(s) Oral at bedtime PRN Insomnia  OLANZapine 5 milliGRAM(s) Oral every 4 hours PRN agitation or aggression  OLANZapine Injectable 5 milliGRAM(s) IntraMuscular once PRN agitation or aggression

## 2024-02-06 NOTE — BH INPATIENT PSYCHIATRY PROGRESS NOTE - NSBHCHARTREVIEWVS_PSY_A_CORE FT
Vital Signs Last 24 Hrs  T(C): 36.6 (02-06-24 @ 07:34), Max: 36.6 (02-06-24 @ 07:34)  T(F): 97.8 (02-06-24 @ 07:34), Max: 97.8 (02-06-24 @ 07:34)  HR: --  BP: --  BP(mean): --  RR: 17 (02-06-24 @ 07:34) (17 - 17)  SpO2: --    Orthostatic VS  02-06-24 @ 07:34  Lying BP: --/-- HR: --  Sitting BP: 131/70 HR: 73  Standing BP: 115/75 HR: 94  Site: --  Mode: --  Orthostatic VS  02-05-24 @ 08:49  Lying BP: --/-- HR: --  Sitting BP: 129/74 HR: 96  Standing BP: 118/68 HR: 100  Site: --  Mode: --

## 2024-02-06 NOTE — BH TREATMENT PLAN - NSBHPRIMARYDX_PSY_ALL_CORE
MDD (major depressive disorder), recurrent, severe, with psychosis    
MDD (major depressive disorder), recurrent, severe, with psychosis

## 2024-02-06 NOTE — BH INPATIENT PSYCHIATRY PROGRESS NOTE - NSBHMSESPABN_PSY_A_CORE
Soft volume/Other
Soft volume/Increased latency/Other
Increased latency/Other
Increased latency/Other
Soft volume/Increased latency/Other
Soft volume/Other
Soft volume/Increased latency/Other
Increased latency/Other
Soft volume/Increased latency/Other
Soft volume/Increased latency/Other

## 2024-02-07 PROCEDURE — 99232 SBSQ HOSP IP/OBS MODERATE 35: CPT

## 2024-02-07 RX ADMIN — SERTRALINE 75 MILLIGRAM(S): 25 TABLET, FILM COATED ORAL at 08:27

## 2024-02-07 RX ADMIN — OLANZAPINE 15 MILLIGRAM(S): 15 TABLET, FILM COATED ORAL at 22:08

## 2024-02-07 NOTE — DIETITIAN INITIAL EVALUATION ADULT - PERTINENT MEDS FT
MEDICATIONS  (STANDING):  OLANZapine 15 milliGRAM(s) Oral at bedtime  sertraline 25 milliGRAM(s) Oral once  sertraline 75 milliGRAM(s) Oral daily    MEDICATIONS  (PRN):  artificial  tears Solution 1 Drop(s) Left EYE three times a day PRN dry eyes  bisacodyl 5 milliGRAM(s) Oral at bedtime PRN constipation  gabapentin 300 milliGRAM(s) Oral four times a day PRN anxiety  melatonin. 5 milliGRAM(s) Oral at bedtime PRN Insomnia  OLANZapine 5 milliGRAM(s) Oral every 4 hours PRN agitation or aggression  OLANZapine Injectable 5 milliGRAM(s) IntraMuscular once PRN agitation or aggression

## 2024-02-07 NOTE — DIETITIAN INITIAL EVALUATION ADULT - PERTINENT LABORATORY DATA
A1C with Estimated Average Glucose Result: 5.7 % (01-26-24 @ 08:30)  A1C with Estimated Average Glucose Result: 5.3 % (06-02-23 @ 08:57)

## 2024-02-07 NOTE — BH INPATIENT PSYCHIATRY PROGRESS NOTE - NSBHCHARTREVIEWVS_PSY_A_CORE FT
Vital Signs Last 24 Hrs  T(C): 36.2 (02-07-24 @ 08:19), Max: 36.2 (02-07-24 @ 08:19)  T(F): 97.1 (02-07-24 @ 08:19), Max: 97.1 (02-07-24 @ 08:19)  HR: --  BP: --  BP(mean): --  RR: 18 (02-07-24 @ 08:19) (18 - 18)  SpO2: --    Orthostatic VS  02-07-24 @ 08:19  Lying BP: --/-- HR: --  Sitting BP: 118/70 HR: 88  Standing BP: 108/81 HR: 97  Site: --  Mode: --  Orthostatic VS  02-06-24 @ 07:34  Lying BP: --/-- HR: --  Sitting BP: 131/70 HR: 73  Standing BP: 115/75 HR: 94  Site: --  Mode: --

## 2024-02-07 NOTE — BH INPATIENT PSYCHIATRY PROGRESS NOTE - NSBHMETABOLIC_PSY_ALL_CORE_FT
BMI: BMI (kg/m2): 28 (01-25-24 @ 19:01)  HbA1c: A1C with Estimated Average Glucose Result: 5.7 % (01-26-24 @ 08:30)    Glucose:   BP: --Vital Signs Last 24 Hrs  T(C): 36.2 (02-07-24 @ 08:19), Max: 36.2 (02-07-24 @ 08:19)  T(F): 97.1 (02-07-24 @ 08:19), Max: 97.1 (02-07-24 @ 08:19)  HR: --  BP: --  BP(mean): --  RR: 18 (02-07-24 @ 08:19) (18 - 18)  SpO2: --    Orthostatic VS  02-07-24 @ 08:19  Lying BP: --/-- HR: --  Sitting BP: 118/70 HR: 88  Standing BP: 108/81 HR: 97  Site: --  Mode: --  Orthostatic VS  02-06-24 @ 07:34  Lying BP: --/-- HR: --  Sitting BP: 131/70 HR: 73  Standing BP: 115/75 HR: 94  Site: --  Mode: --    Lipid Panel: Date/Time: 01-26-24 @ 08:30  Cholesterol, Serum: 209  LDL Cholesterol Calculated: 120  HDL Cholesterol, Serum: 79  Total Cholesterol/HDL Ration Measurement: --  Triglycerides, Serum: 51

## 2024-02-07 NOTE — DIETITIAN INITIAL EVALUATION ADULT - OTHER INFO
Pt. is a 58 year old female with PMHx of knee surgery and PPHx of MDD. Presents to [facility] seeking assessment for worsened symptoms of depression and psychosis.     Met with Pt. in day room today. Pt. reports good appetite since being here and eating most of all meals. Reported having eggs, sausage and waffles at breakfast this morning (02-07-24). NKFA reported. Pt. denies GI issues of nausea, vomiting or diarrhea. Reports constipation and requests prune juice and fruit at [facility]. Requests implemented on [employer]. Writer provided education on fiber and fluid intake. Pt. encouraged to increase water intake from 2-3 to 6-8 cups daily. Pt. denies chewing or swallowing issues on current diet. Pt. current adm wt. 153 lbs. Pt. reports potential weight gain since being admitted. Writer provided education on healthy eating.

## 2024-02-07 NOTE — BH INPATIENT PSYCHIATRY PROGRESS NOTE - NSBHFUPINTERVALHXFT_PSY_A_CORE
Chart reviewed including pertinent labs. Case discussed with nursing staff. Patient reported that she is feeling much better now and denies having any SI. Patient still continues to feeling tired with the medications but we discussed potentially to stop the melatonin and see if her tiredness improves with that change. She denies having any auditory/visual hallucinations and had better insight into her hospitalization. She understands that the medications and therapy treatments are helping her "to be functional" and will keep her mentally healthy. She says that compared to her initial admission, she no longer has racing thoughts and is proud that she no longer needs CO.    patient was seen for f/u for MDD and psychosis. Chart reviewed including pertinent labs. Case discussed with nursing staff. Patient reported that she is feeling much better now and denies having any SI. Patient still continues to feeling tired with the medications but we discussed potentially to stop the melatonin and see if her tiredness improves with that change, patient declined to discontinue asking if she can just refuse tonight to see if it helps. She denies having any auditory/visual hallucinations and had better insight into her hospitalization. She understands that the medications and therapy treatments are helping her "to be functional" and will keep her mentally healthy. She says that compared to her initial admission, she no longer has racing thoughts and is proud that she no longer needs CO. Patient denied any constipation today. Discussed dispo planning, will set up family meeting with son, she agreed.

## 2024-02-07 NOTE — BH INPATIENT PSYCHIATRY PROGRESS NOTE - CURRENT MEDICATION
MEDICATIONS  (STANDING):  OLANZapine 15 milliGRAM(s) Oral at bedtime  sertraline 25 milliGRAM(s) Oral once  sertraline 75 milliGRAM(s) Oral daily    MEDICATIONS  (PRN):  artificial  tears Solution 1 Drop(s) Left EYE three times a day PRN dry eyes  bisacodyl 5 milliGRAM(s) Oral at bedtime PRN constipation  gabapentin 300 milliGRAM(s) Oral four times a day PRN anxiety  melatonin. 5 milliGRAM(s) Oral at bedtime PRN Insomnia  OLANZapine 5 milliGRAM(s) Oral every 4 hours PRN agitation or aggression  OLANZapine Injectable 5 milliGRAM(s) IntraMuscular once PRN agitation or aggression   MEDICATIONS  (STANDING):  OLANZapine 15 milliGRAM(s) Oral at bedtime  sertraline 75 milliGRAM(s) Oral daily    MEDICATIONS  (PRN):  artificial  tears Solution 1 Drop(s) Left EYE three times a day PRN dry eyes  bisacodyl 5 milliGRAM(s) Oral at bedtime PRN constipation  gabapentin 300 milliGRAM(s) Oral four times a day PRN anxiety  melatonin. 5 milliGRAM(s) Oral at bedtime PRN Insomnia  OLANZapine 5 milliGRAM(s) Oral every 4 hours PRN agitation or aggression  OLANZapine Injectable 5 milliGRAM(s) IntraMuscular once PRN agitation or aggression

## 2024-02-07 NOTE — BH INPATIENT PSYCHIATRY PROGRESS NOTE - PRN MEDS
MEDICATIONS  (PRN):  artificial  tears Solution 1 Drop(s) Left EYE three times a day PRN dry eyes  bisacodyl 5 milliGRAM(s) Oral at bedtime PRN constipation  gabapentin 300 milliGRAM(s) Oral four times a day PRN anxiety  melatonin. 5 milliGRAM(s) Oral at bedtime PRN Insomnia  OLANZapine 5 milliGRAM(s) Oral every 4 hours PRN agitation or aggression  OLANZapine Injectable 5 milliGRAM(s) IntraMuscular once PRN agitation or aggression

## 2024-02-07 NOTE — BH INPATIENT PSYCHIATRY PROGRESS NOTE - NSBHASSESSSUMMFT_PSY_ALL_CORE
ASSESSMENT: Patient is a 58-year-old female, single, domiciled with son, has 2 adult son, employed house cleaning and [employer] , PMH: hx of knee surgery, dx with depression with psychosis, 1 prior admission at [facility] from 5/26 to 6/6/23 for SI and depression, 2 prior SA – 1st age [age]yo by OD and was held overnight at a hospital, most recent in May 2023  where she attempted to cut her wrist and took ½ bottle of NyQuil prior to her admission. She was stabilized on Olanzapine 5mg and Sertraline 100mg daily but did not follow up with treatment after discharge (referred to [facility]). She has been taking d/c medication sporadically and last took Olanzapine 5mg 2m ago and sertraline 100mg 2 weeks ago. She recently started treatment at [facility] for psychotherapy with plans for weekly or 2x week visits, denies substance use, denies hx of violence or aggression, no legal hx, no access to firearms, presents to [facility] seeking assessment for worsened symptoms of depression and psychosis. At the time of assessment, clinical picture is improving.     subjective improvements in mood, no SIIPable to contract for safety, med plan as below     PLAN  >would benefit from 9.13 vol admit   >Psych meds:  D/C invega  Continue Zyprexa 15mg  Goal of VILLANUEVA for safety and compliance given hx of noncompliance and suicide attempts with poor insight  Consider start of lithium  Continue Zoloft 75mg daily  >PRNs:  Zyprexa 5 IM and PO, neurontin 300 QID PRN  Comorbids: overweight, none other known  NUT: BMI/height and weights, NUT consult  Family collateral  Psych collateral  G&M therapy  Dispo TBD-- consider PHP ASSESSMENT: Patient is a 58-year-old female, single, domiciled with son, has 2 adult son, 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 from 5/26 to 6/6/23 for SI and depression, 2 prior SA – 1st age [age]yo by OD and was held overnight at a hospital, most recent in May 2023  where she attempted to cut her wrist and took ½ bottle of NyQuil prior to her admission. She was stabilized on Olanzapine 5mg and Sertraline 100mg daily but did not follow up with treatment after discharge (referred to [facility]). She has been taking d/c medication sporadically and last took Olanzapine 5mg 2m ago and sertraline 100mg 2 weeks ago. She recently started treatment at [facility] for psychotherapy with plans for weekly or 2x week visits, denies substance use, denies hx of violence or aggression, no legal hx, no access to firearms, presents to [facility] seeking assessment for worsened symptoms of depression and psychosis. At the time of assessment, clinical picture is improving.     subjective improvements in mood, denying psychosis, less paranoid, no SIIP, able to contract for safety, med plan as below     PLAN  >would benefit from 9.13 vol admit   >Psych meds:  D/C invega  Continue Zyprexa 15mg  Consider start of lithium  Continue Zoloft 75mg daily  >PRNs:  Zyprexa 5 IM and PO, neurontin 300 QID PRN  Comorbids: overweight, none other known  NUT: BMI/height and weights, NUT consult  Family collateral  Psych collateral  G&M therapy  Dispo TBD-- consider PHP

## 2024-02-08 PROCEDURE — 99232 SBSQ HOSP IP/OBS MODERATE 35: CPT

## 2024-02-08 RX ADMIN — Medication 1 DROP(S): at 22:05

## 2024-02-08 RX ADMIN — SERTRALINE 75 MILLIGRAM(S): 25 TABLET, FILM COATED ORAL at 08:38

## 2024-02-08 RX ADMIN — OLANZAPINE 15 MILLIGRAM(S): 15 TABLET, FILM COATED ORAL at 21:58

## 2024-02-08 RX ADMIN — Medication 1 DROP(S): at 17:00

## 2024-02-08 NOTE — BH INPATIENT PSYCHIATRY PROGRESS NOTE - NSBHFUPINTERVALHXFT_PSY_A_CORE
patient was seen for f/u for MDD and psychosis. Chart reviewed including pertinent labs. Case discussed with nursing staff. Patient reported that she is feeling "much better" now and "good". She denies having any SIIP or HIIP. Patient reports feeling more alert today after not taking the melatonin last night. She reports an episode of a L side facial twitch that went away yesterday from her current medication regimen. She denies having any auditory/visual hallucinations and had good insight into her hospitalization. She states that upon admission she was "depressed" and "suicidal". She understands that the importance of her medications and therapy to helped her "be functional". She states she needs to be "accountable" for her actions and be "regimented" and "scheduled" about her medication. She wants to set phone reminders for her medication and is amendable to having her sons helping remind her and holding her accountable with her medication. She states her "mental health comes first".  She understands the importance of outpatient therapy but is worried about the financial implications of partial because she wants to work and not be a financial burden to her sons. She states her sons are supportive and are willing to do what is needed to help her but she wants to be accountable and pay her bills. Patient's last bowel movement was yesterday. She denies having any nausea or vomiting. She discussed drinking more water and eating more fiber/fruits/prune juice with the dietician to help her constipation. Discussed dispo planning, family meeting with son scheduled for tomorrow at 1:30pm, she agreed.    patient was seen for f/u for MDD and psychosis. Chart reviewed including pertinent labs. Case discussed with nursing staff. Patient reported that she is feeling "much better" now and "good". She denies having any SIIP or HIIP. Patient reports feeling more alert today after not taking the melatonin last night, declined discontinuing at this time just in case she has trouble sleeping. She reports an episode of a L side facial twitch that went away yesterday from her current medication regimen. She denies having any auditory/visual hallucinations and had good insight into her hospitalization. She states that upon admission she was "depressed" and "suicidal". She understands that the importance of her medications and therapy to help her "be functional". She states she needs to be "accountable" for her actions and be "regimented" and "scheduled" about her medication. She wants to set phone reminders for her medication and is amendable to having her sons help remind her and holding her accountable with her medication. She states her "mental health comes first".  She understands the importance of outpatient therapy but is worried about the financial implications of partial hospitalization because she wants to work and not be a financial burden to her sons. She states her sons are supportive and are willing to do what is needed to help her but she wants to be accountable and pay her bills. Patient's last bowel movement was yesterday. She denies having any nausea or vomiting. She discussed drinking more water and eating more fiber/fruits/prune juice with the dietician to help her constipation. Discussed dispo planning, family meeting with son scheduled for tomorrow at 1:30pm, she agreed.

## 2024-02-08 NOTE — BH INPATIENT PSYCHIATRY PROGRESS NOTE - NSBHMETABOLIC_PSY_ALL_CORE_FT
BMI: BMI (kg/m2): 28 (01-25-24 @ 19:01)  HbA1c: A1C with Estimated Average Glucose Result: 5.7 % (01-26-24 @ 08:30)    Glucose:   BP: --Vital Signs Last 24 Hrs  T(C): 36.8 (02-08-24 @ 08:11), Max: 36.8 (02-08-24 @ 08:11)  T(F): 98.3 (02-08-24 @ 08:11), Max: 98.3 (02-08-24 @ 08:11)  HR: --  BP: --  BP(mean): --  RR: 18 (02-08-24 @ 08:11) (18 - 18)  SpO2: 99% (02-08-24 @ 08:11) (99% - 99%)    Orthostatic VS  02-08-24 @ 08:11  Lying BP: --/-- HR: --  Sitting BP: 118/70 HR: 85  Standing BP: 123/76 HR: 90  Site: --  Mode: --  Orthostatic VS  02-07-24 @ 08:19  Lying BP: --/-- HR: --  Sitting BP: 118/70 HR: 88  Standing BP: 108/81 HR: 97  Site: --  Mode: --    Lipid Panel: Date/Time: 01-26-24 @ 08:30  Cholesterol, Serum: 209  LDL Cholesterol Calculated: 120  HDL Cholesterol, Serum: 79  Total Cholesterol/HDL Ration Measurement: --  Triglycerides, Serum: 51

## 2024-02-08 NOTE — BH INPATIENT PSYCHIATRY PROGRESS NOTE - CURRENT MEDICATION
MEDICATIONS  (STANDING):  OLANZapine 15 milliGRAM(s) Oral at bedtime  sertraline 75 milliGRAM(s) Oral daily    MEDICATIONS  (PRN):  artificial  tears Solution 1 Drop(s) Left EYE three times a day PRN dry eyes  bisacodyl 5 milliGRAM(s) Oral at bedtime PRN constipation  gabapentin 300 milliGRAM(s) Oral four times a day PRN anxiety  melatonin. 5 milliGRAM(s) Oral at bedtime PRN Insomnia  OLANZapine 5 milliGRAM(s) Oral every 4 hours PRN agitation or aggression  OLANZapine Injectable 5 milliGRAM(s) IntraMuscular once PRN agitation or aggression   MEDICATIONS  (STANDING):  OLANZapine 15 milliGRAM(s) Oral at bedtime  sertraline 75 milliGRAM(s) Oral daily    MEDICATIONS  (PRN):  artificial  tears Solution 1 Drop(s) Left EYE three times a day PRN dry eyes  bisacodyl 5 milliGRAM(s) Oral at bedtime PRN constipation  gabapentin 300 milliGRAM(s) Oral four times a day PRN anxiety  LORazepam     Tablet 1 milliGRAM(s) Oral every 6 hours PRN Anxiety/Agitation  LORazepam   Injectable 1 milliGRAM(s) IntraMuscular once PRN Severe anxiety/Agitation  LORazepam   Injectable 1 milliGRAM(s) IntraMuscular once PRN Severe anxiety/Agitation  melatonin. 5 milliGRAM(s) Oral at bedtime PRN Insomnia  OLANZapine 5 milliGRAM(s) Oral every 4 hours PRN agitation or aggression  OLANZapine Injectable 5 milliGRAM(s) IntraMuscular once PRN agitation or aggression

## 2024-02-08 NOTE — BH INPATIENT PSYCHIATRY PROGRESS NOTE - NSBHCHARTREVIEWVS_PSY_A_CORE FT
Vital Signs Last 24 Hrs  T(C): 36.8 (02-08-24 @ 08:11), Max: 36.8 (02-08-24 @ 08:11)  T(F): 98.3 (02-08-24 @ 08:11), Max: 98.3 (02-08-24 @ 08:11)  HR: --  BP: --  BP(mean): --  RR: 18 (02-08-24 @ 08:11) (18 - 18)  SpO2: 99% (02-08-24 @ 08:11) (99% - 99%)    Orthostatic VS  02-08-24 @ 08:11  Lying BP: --/-- HR: --  Sitting BP: 118/70 HR: 85  Standing BP: 123/76 HR: 90  Site: --  Mode: --  Orthostatic VS  02-07-24 @ 08:19  Lying BP: --/-- HR: --  Sitting BP: 118/70 HR: 88  Standing BP: 108/81 HR: 97  Site: --  Mode: --

## 2024-02-08 NOTE — BH INPATIENT PSYCHIATRY PROGRESS NOTE - PRN MEDS
MEDICATIONS  (PRN):  artificial  tears Solution 1 Drop(s) Left EYE three times a day PRN dry eyes  bisacodyl 5 milliGRAM(s) Oral at bedtime PRN constipation  gabapentin 300 milliGRAM(s) Oral four times a day PRN anxiety  melatonin. 5 milliGRAM(s) Oral at bedtime PRN Insomnia  OLANZapine 5 milliGRAM(s) Oral every 4 hours PRN agitation or aggression  OLANZapine Injectable 5 milliGRAM(s) IntraMuscular once PRN agitation or aggression   MEDICATIONS  (PRN):  artificial  tears Solution 1 Drop(s) Left EYE three times a day PRN dry eyes  bisacodyl 5 milliGRAM(s) Oral at bedtime PRN constipation  gabapentin 300 milliGRAM(s) Oral four times a day PRN anxiety  LORazepam     Tablet 1 milliGRAM(s) Oral every 6 hours PRN Anxiety/Agitation  LORazepam   Injectable 1 milliGRAM(s) IntraMuscular once PRN Severe anxiety/Agitation  LORazepam   Injectable 1 milliGRAM(s) IntraMuscular once PRN Severe anxiety/Agitation  melatonin. 5 milliGRAM(s) Oral at bedtime PRN Insomnia  OLANZapine 5 milliGRAM(s) Oral every 4 hours PRN agitation or aggression  OLANZapine Injectable 5 milliGRAM(s) IntraMuscular once PRN agitation or aggression

## 2024-02-08 NOTE — BH INPATIENT PSYCHIATRY PROGRESS NOTE - NSBHASSESSSUMMFT_PSY_ALL_CORE
ASSESSMENT: Patient is a 58-year-old female, single, domiciled with son, has 2 adult son, employed house cleaning and "[employer] , PMH: hx of knee surgery, dx with depression with psychosis, 1 prior admission at [facility] from 5/26 to 6/6/23 for SI and depression, 2 prior SA – 1st age [age]yo by OD and was held overnight at a hospital, most recent in May 2023  where she attempted to cut her wrist and took ½ bottle of NyQuil prior to her admission. She was stabilized on Olanzapine 5mg and Sertraline 100mg daily but did not follow up with treatment after discharge (referred to [facility]). She has been taking d/c medication sporadically and last took Olanzapine 5mg 2m ago and sertraline 100mg 2 weeks ago. She recently started treatment at [facility] for psychotherapy with plans for weekly or 2x week visits, denies substance use, denies hx of violence or aggression, no legal hx, no access to firearms, presents to [facility] seeking assessment for worsened symptoms of depression and psychosis. At the time of assessment, clinical picture is improving.     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 She was stabilized on Olanzapine 5mg and Sertraline 100mg daily but did not follow up with treatment after discharge (referred to [facility]). She has been taking d/c medication sporadically and last took Olanzapine 5mg 2m ago and sertraline 100mg 2 weeks ago. She recently started treatment at [facility] for psychotherapy with plans for weekly or 2x week visits, denies substance use, denies hx of violence or aggression, no legal hx, no access to firearms, presents to [facility] seeking assessment for worsened symptoms of depression and psychosis. At the time of assessment, clinical picture is improving.     on assessment today, patient had improved mood, denying psychosis, no SIIP, able to contract for safety, med plan as below     PLAN  >Psych meds:  D/C invega  Continue Zyprexa 15mg  Continue Zoloft 75mg daily  >PRNs:  Zyprexa 5 IM and PO, neurontin 300 QID PRN  Comorbids: overweight, none other known  NUT: BMI/height and weights, NUT consult  Family collateral  Psych collateral  G&M therapy  Dispo TBD-- consider PHP

## 2024-02-09 LAB
ALBUMIN SERPL ELPH-MCNC: 4 G/DL — SIGNIFICANT CHANGE UP (ref 3.3–5)
ALP SERPL-CCNC: 99 U/L — SIGNIFICANT CHANGE UP (ref 40–120)
ALT FLD-CCNC: 19 U/L — SIGNIFICANT CHANGE UP (ref 4–33)
ANION GAP SERPL CALC-SCNC: 10 MMOL/L — SIGNIFICANT CHANGE UP (ref 7–14)
AST SERPL-CCNC: 20 U/L — SIGNIFICANT CHANGE UP (ref 4–32)
BILIRUB SERPL-MCNC: <0.2 MG/DL — SIGNIFICANT CHANGE UP (ref 0.2–1.2)
BUN SERPL-MCNC: 20 MG/DL — SIGNIFICANT CHANGE UP (ref 7–23)
CALCIUM SERPL-MCNC: 9.3 MG/DL — SIGNIFICANT CHANGE UP (ref 8.4–10.5)
CHLORIDE SERPL-SCNC: 104 MMOL/L — SIGNIFICANT CHANGE UP (ref 98–107)
CO2 SERPL-SCNC: 28 MMOL/L — SIGNIFICANT CHANGE UP (ref 22–31)
CREAT SERPL-MCNC: 0.88 MG/DL — SIGNIFICANT CHANGE UP (ref 0.5–1.3)
EGFR: 76 ML/MIN/1.73M2 — SIGNIFICANT CHANGE UP
GLUCOSE SERPL-MCNC: 103 MG/DL — HIGH (ref 70–99)
POTASSIUM SERPL-MCNC: 4 MMOL/L — SIGNIFICANT CHANGE UP (ref 3.5–5.3)
POTASSIUM SERPL-SCNC: 4 MMOL/L — SIGNIFICANT CHANGE UP (ref 3.5–5.3)
PROT SERPL-MCNC: 7.2 G/DL — SIGNIFICANT CHANGE UP (ref 6–8.3)
SODIUM SERPL-SCNC: 142 MMOL/L — SIGNIFICANT CHANGE UP (ref 135–145)

## 2024-02-09 PROCEDURE — 99232 SBSQ HOSP IP/OBS MODERATE 35: CPT

## 2024-02-09 RX ADMIN — Medication 5 MILLIGRAM(S): at 22:49

## 2024-02-09 RX ADMIN — OLANZAPINE 15 MILLIGRAM(S): 15 TABLET, FILM COATED ORAL at 21:43

## 2024-02-09 RX ADMIN — SERTRALINE 75 MILLIGRAM(S): 25 TABLET, FILM COATED ORAL at 09:07

## 2024-02-09 NOTE — BH INPATIENT PSYCHIATRY PROGRESS NOTE - PRN MEDS
MEDICATIONS  (PRN):  artificial  tears Solution 1 Drop(s) Left EYE three times a day PRN dry eyes  bisacodyl 5 milliGRAM(s) Oral at bedtime PRN constipation  gabapentin 300 milliGRAM(s) Oral four times a day PRN anxiety  LORazepam     Tablet 1 milliGRAM(s) Oral every 6 hours PRN Anxiety/Agitation  LORazepam   Injectable 1 milliGRAM(s) IntraMuscular once PRN Severe anxiety/Agitation  LORazepam   Injectable 1 milliGRAM(s) IntraMuscular once PRN Severe anxiety/Agitation  melatonin. 5 milliGRAM(s) Oral at bedtime PRN Insomnia  OLANZapine 5 milliGRAM(s) Oral every 4 hours PRN agitation or aggression  OLANZapine Injectable 5 milliGRAM(s) IntraMuscular once PRN agitation or aggression   MEDICATIONS  (PRN):  artificial  tears Solution 1 Drop(s) Left EYE three times a day PRN dry eyes  bisacodyl 5 milliGRAM(s) Oral every 12 hours PRN Constipation  gabapentin 300 milliGRAM(s) Oral four times a day PRN anxiety  LORazepam     Tablet 1 milliGRAM(s) Oral every 6 hours PRN Anxiety/Agitation  LORazepam   Injectable 1 milliGRAM(s) IntraMuscular once PRN Severe anxiety/Agitation  LORazepam   Injectable 1 milliGRAM(s) IntraMuscular once PRN Severe anxiety/Agitation  melatonin. 5 milliGRAM(s) Oral at bedtime PRN Insomnia  OLANZapine 5 milliGRAM(s) Oral every 4 hours PRN agitation or aggression  OLANZapine Injectable 5 milliGRAM(s) IntraMuscular once PRN agitation or aggression

## 2024-02-09 NOTE — BH INPATIENT PSYCHIATRY PROGRESS NOTE - NSBHASSESSSUMMFT_PSY_ALL_CORE
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 She was stabilized on Olanzapine 5mg and Sertraline 100mg daily but did not follow up with treatment after discharge (referred to [facility]). She has been taking d/c medication sporadically and last took Olanzapine 5mg 2m ago and sertraline 100mg 2 weeks ago. She recently started treatment at [facility] for psychotherapy with plans for weekly or 2x week visits, denies substance use, denies hx of violence or aggression, no legal hx, no access to firearms, presents to [facility] seeking assessment for worsened symptoms of depression and psychosis. At the time of assessment, clinical picture is improving.     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 She was stabilized on Olanzapine 5mg and Sertraline 100mg daily but did not follow up with treatment after discharge (referred to [facility]). She has been taking d/c medication sporadically and last took Olanzapine 5mg 2m ago and sertraline 100mg 2 weeks ago. She recently started treatment at [facility] for psychotherapy with plans for weekly or 2x week visits, denies substance use, denies hx of violence or aggression, no legal hx, no access to firearms, presents to [facility] seeking assessment for worsened symptoms of depression and psychosis. At the time of assessment, clinical picture is improving.     On assessment today, patient improved, denying psychosis, no SIIP, attending groups, med plan as below     PLAN  >Psych meds:  D/C invega  Continue Zyprexa 15mg  Continue Zoloft 75mg daily  >PRNs:  Zyprexa 5 IM and PO, neurontin 300 QID PRN, melatonin  Comorbids: overweight, none other known  NUT: BMI/height and weights, NUT consult  Family collateral  Psych collateral  G&M therapy  Dispo IOP

## 2024-02-09 NOTE — BH INPATIENT PSYCHIATRY PROGRESS NOTE - NSBHCHARTREVIEWVS_PSY_A_CORE FT
Vital Signs Last 24 Hrs  T(C): 36.5 (02-09-24 @ 07:44), Max: 36.5 (02-09-24 @ 07:44)  T(F): 97.7 (02-09-24 @ 07:44), Max: 97.7 (02-09-24 @ 07:44)  HR: --  BP: --  BP(mean): --  RR: 19 (02-09-24 @ 07:44) (19 - 19)  SpO2: --    Orthostatic VS  02-09-24 @ 07:44  Lying BP: --/-- HR: --  Sitting BP: 108/69 HR: 77  Standing BP: 123/72 HR: 89  Site: --  Mode: --  Orthostatic VS  02-08-24 @ 08:11  Lying BP: --/-- HR: --  Sitting BP: 118/70 HR: 85  Standing BP: 123/76 HR: 90  Site: --  Mode: --

## 2024-02-09 NOTE — BH INPATIENT PSYCHIATRY PROGRESS NOTE - CURRENT MEDICATION
MEDICATIONS  (STANDING):  OLANZapine 15 milliGRAM(s) Oral at bedtime  sertraline 75 milliGRAM(s) Oral daily    MEDICATIONS  (PRN):  artificial  tears Solution 1 Drop(s) Left EYE three times a day PRN dry eyes  bisacodyl 5 milliGRAM(s) Oral at bedtime PRN constipation  gabapentin 300 milliGRAM(s) Oral four times a day PRN anxiety  LORazepam     Tablet 1 milliGRAM(s) Oral every 6 hours PRN Anxiety/Agitation  LORazepam   Injectable 1 milliGRAM(s) IntraMuscular once PRN Severe anxiety/Agitation  LORazepam   Injectable 1 milliGRAM(s) IntraMuscular once PRN Severe anxiety/Agitation  melatonin. 5 milliGRAM(s) Oral at bedtime PRN Insomnia  OLANZapine 5 milliGRAM(s) Oral every 4 hours PRN agitation or aggression  OLANZapine Injectable 5 milliGRAM(s) IntraMuscular once PRN agitation or aggression   MEDICATIONS  (STANDING):  OLANZapine 15 milliGRAM(s) Oral at bedtime  sertraline 75 milliGRAM(s) Oral daily    MEDICATIONS  (PRN):  artificial  tears Solution 1 Drop(s) Left EYE three times a day PRN dry eyes  bisacodyl 5 milliGRAM(s) Oral every 12 hours PRN Constipation  gabapentin 300 milliGRAM(s) Oral four times a day PRN anxiety  LORazepam     Tablet 1 milliGRAM(s) Oral every 6 hours PRN Anxiety/Agitation  LORazepam   Injectable 1 milliGRAM(s) IntraMuscular once PRN Severe anxiety/Agitation  LORazepam   Injectable 1 milliGRAM(s) IntraMuscular once PRN Severe anxiety/Agitation  melatonin. 5 milliGRAM(s) Oral at bedtime PRN Insomnia  OLANZapine 5 milliGRAM(s) Oral every 4 hours PRN agitation or aggression  OLANZapine Injectable 5 milliGRAM(s) IntraMuscular once PRN agitation or aggression

## 2024-02-09 NOTE — BH INPATIENT PSYCHIATRY PROGRESS NOTE - NSBHMETABOLIC_PSY_ALL_CORE_FT
BMI: BMI (kg/m2): 28 (01-25-24 @ 19:01)  HbA1c: A1C with Estimated Average Glucose Result: 5.7 % (01-26-24 @ 08:30)    Glucose:   BP: --Vital Signs Last 24 Hrs  T(C): 36.5 (02-09-24 @ 07:44), Max: 36.5 (02-09-24 @ 07:44)  T(F): 97.7 (02-09-24 @ 07:44), Max: 97.7 (02-09-24 @ 07:44)  HR: --  BP: --  BP(mean): --  RR: 19 (02-09-24 @ 07:44) (19 - 19)  SpO2: --    Orthostatic VS  02-09-24 @ 07:44  Lying BP: --/-- HR: --  Sitting BP: 108/69 HR: 77  Standing BP: 123/72 HR: 89  Site: --  Mode: --  Orthostatic VS  02-08-24 @ 08:11  Lying BP: --/-- HR: --  Sitting BP: 118/70 HR: 85  Standing BP: 123/76 HR: 90  Site: --  Mode: --    Lipid Panel: Date/Time: 01-26-24 @ 08:30  Cholesterol, Serum: 209  LDL Cholesterol Calculated: 120  HDL Cholesterol, Serum: 79  Total Cholesterol/HDL Ration Measurement: --  Triglycerides, Serum: 51

## 2024-02-09 NOTE — BH INPATIENT PSYCHIATRY PROGRESS NOTE - NSBHFUPINTERVALHXFT_PSY_A_CORE
Patient was seen for f/u for MDD and psychosis. Chart reviewed including pertinent labs. Case discussed with nursing staff. Patient reported that she is feeling "good" and had a "productive day". She has been interacting with everyone and joined all the group sessions today. She even participated in a research study. She denies having any SIIP or HIIP. Patient reports feeling more alert today and did not take the melatonin last night, converting it to PRN just in case she has trouble sleeping. She reports an episode of a L side facial twitch on 2/7 and a 2 min episode of "pins and needles" sensation in her right arm today from her current medication regimen. She denies having any auditory/visual hallucinations. Patient requested bloodwork to check LFTs because she has a history of hepatitis C that was cured with Harvoni prior to the pandemic. Patient is concerned about her liver function in the context of her current medication regime. Discussed dispo planning. Patient is amendable to IOP because group sessions are in the evening and it would allow her to work. Patient wants to work so she can meet her financial obligations and not be a burden to her sons.     Patient was seen for f/u for MDD and psychosis. Chart reviewed including pertinent labs. Case discussed with nursing staff. Patient reported that she is feeling "good" and had a "productive day". She has been interacting with everyone and joined all the group sessions today. She even participated in a research study. She denies having any SIIP or HIIP. Patient reports feeling more alert today and did not take the melatonin last night, converting it to PRN just in case she has trouble sleeping. She reports an episode of a L side facial twitch on 2/7 and a 2 min episode of "pins and needles" sensation in her right arm today, informed to continue to monitor and alert nursing staff. She denies having any auditory/visual hallucinations. Patient requested bloodwork to check LFTs because she has a history of hepatitis C that was cured with Harvoni prior to the pandemic. Patient is concerned about her liver function in the context of her current medication regimen. Discussed dispo planning. Patient is amendable to IOP because group sessions are in the evening and it would allow her to work. Patient wants to work so she can meet her financial obligations and not be a burden to her sons.

## 2024-02-10 RX ADMIN — Medication 5 MILLIGRAM(S): at 22:38

## 2024-02-10 RX ADMIN — OLANZAPINE 15 MILLIGRAM(S): 15 TABLET, FILM COATED ORAL at 22:37

## 2024-02-10 RX ADMIN — SERTRALINE 75 MILLIGRAM(S): 25 TABLET, FILM COATED ORAL at 09:18

## 2024-02-11 RX ORDER — SODIUM CHLORIDE 0.65 %
1 AEROSOL, SPRAY (ML) NASAL THREE TIMES A DAY
Refills: 0 | Status: DISCONTINUED | OUTPATIENT
Start: 2024-02-11 | End: 2024-02-13

## 2024-02-11 RX ADMIN — Medication 5 MILLIGRAM(S): at 13:09

## 2024-02-11 RX ADMIN — Medication 1 DROP(S): at 08:43

## 2024-02-11 RX ADMIN — OLANZAPINE 15 MILLIGRAM(S): 15 TABLET, FILM COATED ORAL at 21:31

## 2024-02-11 RX ADMIN — Medication 1 SPRAY(S): at 18:19

## 2024-02-11 RX ADMIN — Medication 5 MILLIGRAM(S): at 21:30

## 2024-02-11 RX ADMIN — SERTRALINE 75 MILLIGRAM(S): 25 TABLET, FILM COATED ORAL at 08:40

## 2024-02-12 PROCEDURE — 99232 SBSQ HOSP IP/OBS MODERATE 35: CPT

## 2024-02-12 RX ORDER — SERTRALINE 25 MG/1
3 TABLET, FILM COATED ORAL
Qty: 90 | Refills: 0
Start: 2024-02-12 | End: 2024-03-12

## 2024-02-12 RX ORDER — OLANZAPINE 15 MG/1
1 TABLET, FILM COATED ORAL
Qty: 30 | Refills: 0
Start: 2024-02-12 | End: 2024-03-12

## 2024-02-12 RX ORDER — LANOLIN ALCOHOL/MO/W.PET/CERES
1 CREAM (GRAM) TOPICAL
Qty: 30 | Refills: 0
Start: 2024-02-12 | End: 2024-03-12

## 2024-02-12 RX ADMIN — OLANZAPINE 15 MILLIGRAM(S): 15 TABLET, FILM COATED ORAL at 21:03

## 2024-02-12 RX ADMIN — SERTRALINE 75 MILLIGRAM(S): 25 TABLET, FILM COATED ORAL at 08:17

## 2024-02-12 RX ADMIN — Medication 5 MILLIGRAM(S): at 21:04

## 2024-02-12 NOTE — BH PSYCHOLOGY - GROUP THERAPY NOTE - NSPSYCHOLGRPDBTPT_PSY_A_CORE FT
Patient attended a Dialectal Behavior Therapy Group (DBT) group focused on Emotion Regulation. Group began with a brief check in asking pts to share review of skills and/or present emotions. Pt participated in a mindfulness exercise and were encouraged to share thought/reactions. The group focused on the Emotion Regulation module that addressed ways to accumulate positive emotions in the long term to build a “life worth living.” The group discussed how to make changes in their lives so that positive events will happen more often in the future. Group facilitator explained concepts, reinforced participation, and engaged patients in discussion.

## 2024-02-12 NOTE — BH INPATIENT PSYCHIATRY PROGRESS NOTE - NSTXDEPRESDATETRGT_PSY_ALL_CORE
06-Feb-2024
13-Feb-2024
06-Feb-2024
13-Feb-2024
06-Feb-2024
13-Feb-2024
06-Feb-2024
13-Feb-2024

## 2024-02-12 NOTE — BH INPATIENT PSYCHIATRY PROGRESS NOTE - NSTXDCOPNODATEEST_PSY_ALL_CORE
05-Feb-2024
26-Jan-2024
26-Jan-2024
05-Feb-2024
26-Jan-2024
05-Feb-2024
26-Jan-2024
26-Jan-2024
12-Feb-2024
26-Jan-2024
26-Jan-2024
05-Feb-2024
26-Jan-2024

## 2024-02-12 NOTE — BH INPATIENT PSYCHIATRY PROGRESS NOTE - NSICDXBHPRIMARYDX_PSY_ALL_CORE
MDD (major depressive disorder), recurrent, severe, with psychosis   F33.3  

## 2024-02-12 NOTE — BH INPATIENT PSYCHIATRY PROGRESS NOTE - NSBHCONSDANGERSELF_PSY_A_CORE
suicidal ideation with plan and means/unable to care for self

## 2024-02-12 NOTE — BH PSYCHOLOGY - GROUP THERAPY NOTE - TOKEN PULL-DIAGNOSIS
Primary Diagnosis:  MDD (major depressive disorder), recurrent, severe, with psychosis [F33.3]        Problem Dx:

## 2024-02-12 NOTE — BH INPATIENT PSYCHIATRY PROGRESS NOTE - NSTXDCOPNOPROGRES_PSY_ALL_CORE
No Change
Met - goal discontinued
Met - goal discontinued
No Change

## 2024-02-12 NOTE — BH INPATIENT PSYCHIATRY PROGRESS NOTE - NSBHFUPINTERVALCCFT_PSY_A_CORE
follow up mood + psychosis 
"I am feeling much better".
"I am feeling much better".
"I want to die"
follow up mood + psychosis 
"I am feeling much better".
"I want to die"
Depression/SI/psychosis
"I am feeling much better".
Depression/SI/psychosis
follow up mood / psychosis 
"I am feeling much better".
"I'm not going to make it"
"I want to die"

## 2024-02-12 NOTE — BH INPATIENT PSYCHIATRY PROGRESS NOTE - NSBHPSYCHOLCOGORIENT_PSY_A_CORE
Not fully oriented...
Not fully oriented...
Oriented to time, place, person, situation
Not fully oriented...
Oriented to time, place, person, situation
Not fully oriented...
Oriented to time, place, person, situation
Not fully oriented...
Oriented to time, place, person, situation
Not fully oriented...
Oriented to time, place, person, situation
Not fully oriented...
Oriented to time, place, person, situation

## 2024-02-12 NOTE — BH INPATIENT PSYCHIATRY PROGRESS NOTE - NSBHATTESTATTENDPERFORM_PSY_A_CORE
Medical Decision Making

## 2024-02-12 NOTE — BH INPATIENT PSYCHIATRY PROGRESS NOTE - NSBHATTESTATTENDNAMEFT_PSY_A_CORE
Dr Hernandez

## 2024-02-12 NOTE — BH INPATIENT PSYCHIATRY PROGRESS NOTE - NSBHCHARTREVIEWVS_PSY_A_CORE FT
Vital Signs Last 24 Hrs  T(C): 36.3 (02-12-24 @ 08:00), Max: 36.3 (02-12-24 @ 08:00)  T(F): 97.3 (02-12-24 @ 08:00), Max: 97.3 (02-12-24 @ 08:00)  HR: --  BP: --  BP(mean): --  RR: 18 (02-12-24 @ 08:00) (18 - 18)  SpO2: --    Orthostatic VS  02-12-24 @ 08:00  Lying BP: --/-- HR: --  Sitting BP: 132/78 HR: 75  Standing BP: 122/78 HR: 76  Site: --  Mode: --  Orthostatic VS  02-11-24 @ 08:35  Lying BP: --/-- HR: --  Sitting BP: 103/62 HR: 87  Standing BP: 95/59 HR: 94  Site: --  Mode: --

## 2024-02-12 NOTE — BH INPATIENT PSYCHIATRY PROGRESS NOTE - NSTXPSYCHOINTERMD_PSY_ALL_CORE
Med management; group and milieu therapy

## 2024-02-12 NOTE — BH INPATIENT PSYCHIATRY PROGRESS NOTE - NSDCCRITERIA_PSY_ALL_CORE
cgi<=2  VILLANUEVA

## 2024-02-12 NOTE — BH INPATIENT PSYCHIATRY PROGRESS NOTE - NSTXDEPRESDATEEST_PSY_ALL_CORE
30-Jan-2024

## 2024-02-12 NOTE — BH INPATIENT PSYCHIATRY PROGRESS NOTE - NSBHMSESPEECH_PSY_A_CORE
Abnormal as indicated, otherwise normal...
Normal volume, rate, productivity, spontaneity and articulation
Abnormal as indicated, otherwise normal...
Normal volume, rate, productivity, spontaneity and articulation
Abnormal as indicated, otherwise normal...
Abnormal as indicated, otherwise normal...
Normal volume, rate, productivity, spontaneity and articulation
Normal volume, rate, productivity, spontaneity and articulation
Abnormal as indicated, otherwise normal...

## 2024-02-12 NOTE — BH INPATIENT PSYCHIATRY PROGRESS NOTE - ADDITIONAL DETAILS / COMMENTS
As per [facility] CC eval: MSE notable for: Consciousness: full; Appearance: stated age; fair eye contact; fair grooming and hygiene Behavior: suspicious, Speech: WNL Mood: “depressed” and anxious; Affect: constricted; thought process: linear; Thought content: significant for paranoia, Capgras delusions, SI, denied HI, AVH, , Insight/Judgement: fair.  
As per [facility] CC eval: MSE notable for: Consciousness: full; Appearance: stated age; fair eye contact; fair grooming and hygiene Behavior: suspicious, Speech: WNL Mood: “depressed” and anxious; Affect: constricted; thought process: linear; Thought content: significant for paranoia, Capgras delusions, SI, denied HI, AVH, , Insight/Judgement: fair.  
As per [facility] CC eval: MSE notable for: Consciousness: full; Appearance: stated age; fair eye contact; fair grooming and hygiene Behavior: suspicious, Speech: WNL Mood: “depressed” and anxious; Affect: constricted; thought process: linear; Thought content: significant for paranoia, Capgras delusions, SI, denied HI, AVH, , Insight/Judgement: fair.  
As per [facility] CC eval: MSE notable for: Consciousness: full; Appearance: stated age; fair eye contact; fair grooming and hygiene Behavior: suspicious, Speech: WNL Mood: “depressed” and anxious; Affect: constricted; thought process: linear; Thought content: significant for paranoia, Capgras delusions, SI, denied HI, AVH, , Insight/Judgement: fair.  
As per [facility] CC eval: MSE notable for: Consciousness: full; Appearance: stated age; fair eye contact; fair grooming and hygiene Behavior: suspicious, Speech: WNL Mood: “depressed” and anxious; Affect: constricted; thought process: linear; Thought content: significant for paranoia, Capgras delusions, SI, denied HI, AVH, , Insight/Judgement: fair.  
Patient provided good insight into her admission and was able to identify coping skills to help her adhere to medication regimen and therapy
As per [facility] CC eval: MSE notable for: Consciousness: full; Appearance: stated age; fair eye contact; fair grooming and hygiene Behavior: suspicious, Speech: WNL Mood: “depressed” and anxious; Affect: constricted; thought process: linear; Thought content: significant for paranoia, Capgras delusions, SI, denied HI, AVH, , Insight/Judgement: fair.  
As per [facility] CC eval: MSE notable for: Consciousness: full; Appearance: stated age; fair eye contact; fair grooming and hygiene Behavior: suspicious, Speech: WNL Mood: “depressed” and anxious; Affect: constricted; thought process: linear; Thought content: significant for paranoia, Capgras delusions, SI, denied HI, AVH, , Insight/Judgement: fair.  
As per [facility] CC eval: MSE notable for: Consciousness: full; Appearance: stated age; fair eye contact; fair grooming and hygiene Behavior: suspicious, Speech: WNL Mood: “depressed” and anxious; Affect: constricted; thought process: linear; Thought content: significant for paranoia, Capgras delusions, SI, denied HI, AVH, , Insight/Judgement: fair.  
Patient provided good insight into her admission and was able to identify coping skills to help her adhere to medication regimen and therapy
As per [facility] CC eval: MSE notable for: Consciousness: full; Appearance: stated age; fair eye contact; fair grooming and hygiene Behavior: suspicious, Speech: WNL Mood: “depressed” and anxious; Affect: constricted; thought process: linear; Thought content: significant for paranoia, Capgras delusions, SI, denied HI, AVH, , Insight/Judgement: fair.  
As per [facility] CC eval: MSE notable for: Consciousness: full; Appearance: stated age; fair eye contact; fair grooming and hygiene Behavior: suspicious, Speech: WNL Mood: “depressed” and anxious; Affect: constricted; thought process: linear; Thought content: significant for paranoia, Capgras delusions, SI, denied HI, AVH, , Insight/Judgement: fair.  
Her insight into her thoughts about her initial admission has improved but she remains guarded and require more questioning to further explore her thought processes. 
Patient provided good insight into her admission and was able to identify coping skills to help her adhere to medication regimen and therapy

## 2024-02-12 NOTE — BH INPATIENT PSYCHIATRY PROGRESS NOTE - NSBHMSEMOOD_PSY_A_CORE
Depressed/Anxious/Irritable
Depressed/Anxious
Normal
Depressed/Anxious/Irritable
Normal
Depressed/Anxious/Irritable
Depressed/Anxious
Normal
Depressed/Anxious
Normal
Depressed/Anxious
Normal
Depressed/Anxious/Irritable
Normal

## 2024-02-12 NOTE — BH INPATIENT PSYCHIATRY PROGRESS NOTE - NSBHMSEAFFQUAL_PSY_A_CORE
Depressed/Irritable/Anxious
Depressed/Irritable/Anxious
Depressed/Anxious
Depressed/Irritable/Anxious
Euthymic
Depressed/Anxious
Euthymic
Depressed/Irritable/Anxious
Depressed/Anxious
Depressed/Anxious
Depressed/Irritable/Anxious
Depressed/Anxious

## 2024-02-12 NOTE — BH INPATIENT PSYCHIATRY PROGRESS NOTE - NSTXPSYCHODATETRGT_PSY_ALL_CORE
08-Feb-2024
06-Feb-2024
02-Feb-2024
02-Feb-2024
08-Feb-2024
08-Feb-2024
06-Feb-2024
13-Feb-2024
15-Feb-2024
02-Feb-2024
02-Feb-2024
08-Feb-2024
08-Feb-2024
15-Feb-2024
06-Feb-2024
13-Feb-2024

## 2024-02-12 NOTE — BH INPATIENT PSYCHIATRY PROGRESS NOTE - NSTXDCOPNODATETRGT_PSY_ALL_CORE
02-Feb-2024
12-Feb-2024
06-Feb-2024
06-Feb-2024
02-Feb-2024
06-Feb-2024
02-Feb-2024
06-Feb-2024
02-Feb-2024
06-Feb-2024
19-Feb-2024
13-Feb-2024
13-Feb-2024
06-Feb-2024
06-Feb-2024
13-Feb-2024

## 2024-02-12 NOTE — BH INPATIENT PSYCHIATRY PROGRESS NOTE - NSTXDEPRESGOAL_PSY_ALL_CORE
Will identify 2 coping skills that assist in improving mood

## 2024-02-12 NOTE — BH PSYCHOLOGY - GROUP THERAPY NOTE - NSBHPSYCHOLRESPCOMMENT_PSY_A_CORE FT
The patient appeared adequately groomed and casually dressed. Pt was engaged in the group as evidenced by participating in group discussion. Pt appropriately shared past barriers to accumulating positives and how she has resolved these difficulties. Pt volunteered to read from the worksheet when given the opportunity. Pt was oriented X3. Patient was open to feedback, and she was appropriate with her peers. Pt found the group to be helpful and was encouraged to practice the skill.

## 2024-02-12 NOTE — BH INPATIENT PSYCHIATRY PROGRESS NOTE - NSBHASSESSSUMMFT_PSY_ALL_CORE
ASSESSMENT: Patient is a 58-year-old female, single, domiciled with son, has 2 adult son, employed house cleaning and [employer] , PMH: hx of knee surgery, dx with depression with psychosis, 1 prior admission at [facility] from 5/26 to 6/6/23 for SI and depression, 2 prior SA – 1st age [age]yo by OD and was held overnight at a hospital, most recent in May 2023  where she attempted to cut her wrist and took ½ bottle of NyQuil prior to her admission. She was stabilized on Olanzapine 5mg and Sertraline 100mg daily but did not follow up with treatment after discharge (referred to [facility]). She has been taking d/c medication sporadically and last took Olanzapine 5mg 2m ago and sertraline 100mg 2 weeks ago. She recently started treatment at [facility] for psychotherapy with plans for weekly or 2x week visits, denies substance use, denies hx of violence or aggression, no legal hx, no access to firearms, presents to [facility] seeking assessment for worsened symptoms of depression and psychosis. At the time of assessment, clinical picture is improving.     On assessment today, patient improved, denying psychosis, no SIIP, attending groups, med plan as below     PLAN  >Psych meds:  D/C invega  Continue Zyprexa 15mg  Continue Zoloft 75mg daily  >PRNs:  Zyprexa 5 IM and PO, neurontin 300 QID PRN, melatonin  Comorbids: overweight, none other known  NUT: BMI/height and weights, NUT consult  Family collateral  Psych collateral  G&M therapy  Dispo IOP

## 2024-02-12 NOTE — BH INPATIENT PSYCHIATRY PROGRESS NOTE - NSBHFUPINTERVALHXFT_PSY_A_CORE
Patient was seen for f/u for MDD and psychosis. Chart reviewed including pertinent labs. Case discussed with nursing staff. Patient reported that she is feeling "good". She has been interacting with everyone and joined all the group sessions over the weekend.  She denies having any SIIP or HIIP. Patient reports feeling more tired today as she took melatonin last night to help her fall asleep faster. She wonders if her sleepiness may be due to her Zyprexa and wanted to know if it's possible to lower the dose. Given that her symptoms have improved on the current medication regime, we do not want to lower Zyprexa. She also reported better energy levels when she took the Zyprexa without the melatonin so we think her sleepiness is due to the melatonin.  She denies having any auditory/visual hallucinations. Discussed dispo planning and patient in agreement with the plan.   Patient was seen for f/u for MDD and psychosis. Chart reviewed including pertinent labs. Case discussed with nursing staff. Patient reported that she is feeling "good". She has been interacting with everyone and joined all the group sessions over the weekend.  She denies having any SIIP or HIIP. Patient reports feeling more tired today as she took melatonin last night to help her fall asleep faster. She wonders if her sleepiness may be due to her Zyprexa and wanted to know if it's possible to lower the dose. Given that her symptoms have improved on the current medication regimen and upcoming discharge, we recommended to hold off changing medication, encouraged to speak with outpatient team about medication dosages. She also reported better energy levels when she took the Zyprexa without the melatonin so it was suggested she refrain from taking Melatonin. She denies having any auditory/visual hallucinations. Discussed dispo planning and patient in agreement with the plan.

## 2024-02-12 NOTE — BH INPATIENT PSYCHIATRY PROGRESS NOTE - NSBHATTESTATTENDBILLTIME2_PSY_A_CORE
I have reviewed and verified the documentation.

## 2024-02-12 NOTE — BH INPATIENT PSYCHIATRY PROGRESS NOTE - PRN MEDS
MEDICATIONS  (PRN):  artificial  tears Solution 1 Drop(s) Left EYE three times a day PRN dry eyes  bisacodyl 5 milliGRAM(s) Oral every 12 hours PRN Constipation  gabapentin 300 milliGRAM(s) Oral four times a day PRN anxiety  LORazepam     Tablet 1 milliGRAM(s) Oral every 6 hours PRN Anxiety/Agitation  LORazepam   Injectable 1 milliGRAM(s) IntraMuscular once PRN Severe anxiety/Agitation  LORazepam   Injectable 1 milliGRAM(s) IntraMuscular once PRN Severe anxiety/Agitation  melatonin. 5 milliGRAM(s) Oral at bedtime PRN Insomnia  OLANZapine 5 milliGRAM(s) Oral every 4 hours PRN agitation or aggression  OLANZapine Injectable 5 milliGRAM(s) IntraMuscular once PRN agitation or aggression  sodium chloride 0.65% Nasal 1 Spray(s) Both Nostrils three times a day PRN Nasal Congestion

## 2024-02-12 NOTE — BH INPATIENT PSYCHIATRY PROGRESS NOTE - NSBHATTESTATTENDBILLTIME_PSY_A_CORE
I independently performed the documented

## 2024-02-12 NOTE — BH INPATIENT PSYCHIATRY PROGRESS NOTE - NSTXPSYCHODATEEST_PSY_ALL_CORE
26-Jan-2024

## 2024-02-12 NOTE — BH INPATIENT PSYCHIATRY PROGRESS NOTE - CURRENT MEDICATION
MEDICATIONS  (STANDING):  OLANZapine 15 milliGRAM(s) Oral at bedtime  sertraline 75 milliGRAM(s) Oral daily    MEDICATIONS  (PRN):  artificial  tears Solution 1 Drop(s) Left EYE three times a day PRN dry eyes  bisacodyl 5 milliGRAM(s) Oral every 12 hours PRN Constipation  gabapentin 300 milliGRAM(s) Oral four times a day PRN anxiety  LORazepam     Tablet 1 milliGRAM(s) Oral every 6 hours PRN Anxiety/Agitation  LORazepam   Injectable 1 milliGRAM(s) IntraMuscular once PRN Severe anxiety/Agitation  LORazepam   Injectable 1 milliGRAM(s) IntraMuscular once PRN Severe anxiety/Agitation  melatonin. 5 milliGRAM(s) Oral at bedtime PRN Insomnia  OLANZapine 5 milliGRAM(s) Oral every 4 hours PRN agitation or aggression  OLANZapine Injectable 5 milliGRAM(s) IntraMuscular once PRN agitation or aggression  sodium chloride 0.65% Nasal 1 Spray(s) Both Nostrils three times a day PRN Nasal Congestion

## 2024-02-12 NOTE — BH INPATIENT PSYCHIATRY PROGRESS NOTE - NSBHMETABOLIC_PSY_ALL_CORE_FT
BMI: BMI (kg/m2): 28 (01-25-24 @ 19:01)  HbA1c: A1C with Estimated Average Glucose Result: 5.7 % (01-26-24 @ 08:30)    Glucose:   BP: --Vital Signs Last 24 Hrs  T(C): 36.3 (02-12-24 @ 08:00), Max: 36.3 (02-12-24 @ 08:00)  T(F): 97.3 (02-12-24 @ 08:00), Max: 97.3 (02-12-24 @ 08:00)  HR: --  BP: --  BP(mean): --  RR: 18 (02-12-24 @ 08:00) (18 - 18)  SpO2: --    Orthostatic VS  02-12-24 @ 08:00  Lying BP: --/-- HR: --  Sitting BP: 132/78 HR: 75  Standing BP: 122/78 HR: 76  Site: --  Mode: --  Orthostatic VS  02-11-24 @ 08:35  Lying BP: --/-- HR: --  Sitting BP: 103/62 HR: 87  Standing BP: 95/59 HR: 94  Site: --  Mode: --    Lipid Panel: Date/Time: 01-26-24 @ 08:30  Cholesterol, Serum: 209  LDL Cholesterol Calculated: 120  HDL Cholesterol, Serum: 79  Total Cholesterol/HDL Ration Measurement: --  Triglycerides, Serum: 51

## 2024-02-13 VITALS — RESPIRATION RATE: 20 BRPM | TEMPERATURE: 98 F

## 2024-02-13 PROCEDURE — 99238 HOSP IP/OBS DSCHRG MGMT 30/<: CPT

## 2024-02-13 RX ADMIN — SERTRALINE 75 MILLIGRAM(S): 25 TABLET, FILM COATED ORAL at 09:46

## 2024-02-13 NOTE — BH INPATIENT PSYCHIATRY DISCHARGE NOTE - ADDITIONAL DETAILS / COMMENTS
Patient provided good insight into her admission and was able to identify coping skills to help her adhere to medication regimen and therapy

## 2024-02-13 NOTE — BH DISCHARGE NOTE NURSING/SOCIAL WORK/PSYCH REHAB - DISCHARGE INSTRUCTIONS AFTERCARE APPOINTMENTS
In order to check the location, date, or time of your aftercare appointment, please refer to your Discharge Instructions Document given to you upon leaving the hospital.  If you have lost the instructions please call [phone]

## 2024-02-13 NOTE — BH INPATIENT PSYCHIATRY DISCHARGE NOTE - NSBHDCHANDOFFFT_PSY_ALL_CORE
Discharge summary faxed to (provider/clinic). Discussed treatment plan and meds included my contact information Rachele Wynne NP, [phone].  Discharge summary faxed to [facility]. Discussed treatment plan and meds included my contact information Rachele Wynne NP, [phone].  Discharge summary emailed to [facility]. Discussed treatment plan and meds included my contact information Rachele Wynne NP, [phone].

## 2024-02-13 NOTE — BH INPATIENT PSYCHIATRY DISCHARGE NOTE - NSBHMETABOLIC_PSY_ALL_CORE_FT
BMI: BMI (kg/m2): 28 (01-25-24 @ 19:01)  HbA1c: A1C with Estimated Average Glucose Result: 5.7 % (01-26-24 @ 08:30)    Glucose:   BP: --Vital Signs Last 24 Hrs  T(C): 36.4 (02-13-24 @ 08:17), Max: 36.4 (02-13-24 @ 08:17)  T(F): 97.6 (02-13-24 @ 08:17), Max: 97.6 (02-13-24 @ 08:17)  HR: --  BP: --  BP(mean): --  RR: 20 (02-13-24 @ 08:17) (20 - 20)  SpO2: --    Orthostatic VS  02-13-24 @ 08:17  Lying BP: --/-- HR: --  Sitting BP: 117/69 HR: 89  Standing BP: 111/88 HR: 94  Site: --  Mode: --  Orthostatic VS  02-12-24 @ 08:00  Lying BP: --/-- HR: --  Sitting BP: 132/78 HR: 75  Standing BP: 122/78 HR: 76  Site: --  Mode: --    Lipid Panel: Date/Time: 01-26-24 @ 08:30  Cholesterol, Serum: 209  LDL Cholesterol Calculated: 120  HDL Cholesterol, Serum: 79  Total Cholesterol/HDL Ration Measurement: --  Triglycerides, Serum: 51

## 2024-02-13 NOTE — BH INPATIENT PSYCHIATRY DISCHARGE NOTE - NSBHSUICIDESTATUS_PSY_ALL_CORE
Patient denied SIIP currently, was future oriented and goal directed, looking forward to going back to work and spending time with sons.

## 2024-02-13 NOTE — BH INPATIENT PSYCHIATRY DISCHARGE NOTE - NSBHASSESSSUMMFT_PSY_ALL_CORE
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 She was stabilized on Olanzapine 5mg and Sertraline 100mg daily but did not follow up with treatment after discharge (referred to [facility]). She has been taking d/c medication sporadically and last took Olanzapine 5mg 2m ago and sertraline 100mg 2 weeks ago. She recently started treatment at [facility] for psychotherapy with plans for weekly or 2x week visits, denies substance use, denies hx of violence or aggression, no legal hx, no access to firearms, presents to [facility] seeking assessment for worsened symptoms of depression and psychosis. At the time of assessment, clinical picture is improving.     On assessment today, patient improved, denying psychosis, no SIIP, attending groups, med plan as below     PLAN  >Psych meds:  D/C invega  Continue Zyprexa 15mg  Continue Zoloft 75mg daily  >PRNs:  Zyprexa 5 IM and PO, neurontin 300 QID PRN, melatonin  Comorbids: overweight, none other known  NUT: BMI/height and weights, NUT consult  Family collateral  Psych collateral  G&M therapy  Dispo IOP

## 2024-02-13 NOTE — BH INPATIENT PSYCHIATRY DISCHARGE NOTE - NSBHFUPINTERVALHXFT_PSY_A_CORE
Patient was seen for f/u for MDD and psychosis. Chart reviewed including pertinent labs. Case discussed with nursing staff. Patient reported that she is feeling "good". She has been interacting with everyone and joined all the group sessions over the weekend.  She denies having any SIIP or HIIP. Patient reports feeling more tired today as she took melatonin last night to help her fall asleep faster. She wonders if her sleepiness may be due to her Zyprexa and wanted to know if it's possible to lower the dose. Given that her symptoms have improved on the current medication regimen and upcoming discharge, we recommended to hold off changing medication, encouraged to speak with outpatient team about medication dosages. She also reported better energy levels when she took the Zyprexa without the melatonin so it was suggested she refrain from taking Melatonin. She denies having any auditory/visual hallucinations. Discussed dispo planning and patient in agreement with the plan.   Patient was seen for f/u for MDD and psychosis. Chart reviewed including pertinent labs. Case discussed with nursing staff.    Patient was seen for f/u for MDD and psychosis. Chart reviewed including pertinent labs. Case discussed with nursing staff. Patient reported feeling "great", was looking forward to upcoming discharge. Patient was bright, excited, focused of continuing treatment. Patient reported good sleep and appetite. Patient denied SIIP, was future oriented and goal directed, looking forward to spending time with sons. Patient denied AVH. Patient was educated about resources, she can go to the crisis center for worsening symptoms, or in emergency call 911 or go to the emergency room.

## 2024-02-13 NOTE — BH INPATIENT PSYCHIATRY DISCHARGE NOTE - HPI (INCLUDE ILLNESS QUALITY, SEVERITY, DURATION, TIMING, CONTEXT, MODIFYING FACTORS, ASSOCIATED SIGNS AND SYMPTOMS)
Patient seen and evaluated in  [facility] with Eryn Finnegan LMSW. We discussed the case, and I met with and personally examined the patient. I reviewed all pertinent clinical information including the patient’s physical health and mental status. I was directly involved in the management plan and recommendations of care provided to the patient. Below is a summary of our combined findings. Total time excludes SW assessment.    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 She was stabilized on Olanzapine 5mg and Sertraline 100mg daily but did not follow up with treatment after discharge (referred to [facility]). She has been taking d/c medication sporadically and last took Olanzapine 5mg 2m ago and sertraline 100mg 2 weeks ago. She recently started treatment at [facility] for psychotherapy with plans for weekly or 2x week visits, denies substance use, denies hx of violence or aggression, no legal hx, no access to firearms, presents to [facility] seeking assessment for worsened symptoms of depression and psychosis.     She reports worsening symptom of depression over the last week with sad and depression mood, poor sleep with difficulties falling and staying asleep, and averages 4 hours at night. She describes “life” as a general stressor. She reports poor appetite and averages 1 meal day. She suspects weight loss. She reports anhedonia, denies feeling of guilt or low selflessness, denies feeling of worthlessness. She reports periods of crying, denies memory or concentration changes. she reports anxiety where she feels overwhelmed, jittery, racing thought and rapid heartbeat. She reports excessive fears of everyone and everything and worries. She denies AVH, paranoia, symptoms of magdy. 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 She reports SI at times without identified plan or intent. She denies SI at present.    Collateral from her son Forrest [phone]: She gets in episode where she says she is a lizard another time said she is a lion. Reports pt making comments that her son is not her son.     ON INTERVIEW WITH ME:  Patient confirms above, with the following corrections/emphases: Patient seen with her son Forrest at her request. She presents with symptoms of depression, anxiety, psychosis, and SI. She has not followed up with treatment since her d/c in 6/2023. She has been taking medication sporadically when feeling anxious of overwhelmed. Symptoms worsened 1 week ago without any specific trigger. She describes feeling more anxious, overwhelmed, depressed with poor sleep, and averaging 4 hours at night. Her appetite is poor with 6lb weight loss in one week. She reports poor focus and concentration, anhedonia, and low motivation. She reports paranoia that people are going to kill or rape her. She has been calling out of work all week and describes feeling disorganized and confused. During assessment asking if her son or her sister are going to Kill her. During assessment, she was noted to be looking at her son, reports she is unsure if he is her son and wonders if he is going to kill or rape her. She reports SI starting 1 week ago with thoughts “take something.” She endorses access to means including pills at home. She denies current plan or intent but unable to guarantee her safety in the community, reports “I don’t know if I can be safe.” She appears anxious and distressed. She denies HI, AVH, no hx of hannah.     Per collateral from her son Forrest. He went to pick her up for a scheduled colonoscopy today. She started saying she doesn’t want to be alive, none of this is real, didn’t think he was her son, saying people are going to kill and rape her. She would refer to herself as a lizard or a lion, prompting [facility] visit.  He advocated for admission.    She connected with Therapist at [facility] for 1 session who is connecting her to psychiatry.     On 2W, pt seen by [facility] attending soon after arrival from [facility]. Appears somewhat guarded and paranoid, but did meet with MD. Answers questions in limited manner, revealing little unless MD corroborated info from NP in CC first, then asked questions about specifics. Does endorse still believing her son is not her actual son, but not at all times. Feels mood is down, says main problem is "racing thoughts" yet only had such one week ago. Then tells MD source of stress in her life, as all persons has to do with how we handle life, then adds her problem is "a lack of planning." But pt does not clarify any of these details, with prominent guardedness. Denies wish to harm self on unit, no CO warranted at this time. Says she exercises, has no health problems, and eats healthy-- noted to be eating a chicken and corn salad while meeting with MD. Agrees to work with day team. Did endorse past zyprexa Rx, also with weight gain, but agrees to trial of invega, and pleased it could help with racing thoughts, reports no hx of lithium trial.

## 2024-02-13 NOTE — BH DISCHARGE NOTE NURSING/SOCIAL WORK/PSYCH REHAB - PATIENT PORTAL LINK FT
You can access the FollowMyHealth Patient Portal offered by [facility] by registering at the following website: [email or web address]. By joining [employer]’s FollowMyHealth portal, you will also be able to view your health information using other applications (apps) compatible with our system.

## 2024-02-13 NOTE — BH DISCHARGE NOTE NURSING/SOCIAL WORK/PSYCH REHAB - NSDCPRGOAL_PSY_ALL_CORE
The writer met with patient to safety plan for discharge and discuss the patient’s progress throughout her inpatient stay. Patient was receptive to safety planning and readily identified warning signs, coping skills, triggers, and reasons for living. The patient presented to the [facility] seeking assessment for worsened symptoms of depression and psychosis. The patient met her psychiatric rehabilitation goal to identify 2 coping skills that assist with focus on reality for her psychotic symptoms goal. The patient reported calling her sons for support, meeting with her treatment team, exercising, and playing pickleball as her coping skills. The patient is compliant with her medications, engages with her peers, and has good ADL’s and behavioral control. The patient denied SI/HI/AH/VH. The patient attended 85% of the Psychiatric Rehabilitation groups throughout her stay. Psychiatric Rehabilitation staff recommends that the patient engage in outpatient services for continued medication and symptom management.

## 2024-02-13 NOTE — BH INPATIENT PSYCHIATRY DISCHARGE NOTE - HOSPITAL COURSE
Dates of hospitalization:  01/25/24-02/13/24     On admission interview, patient presented with the following signs and symptoms:  reports worsening symptom of depression over the last week with sad and depression mood, poor sleep with difficulties falling and staying asleep, and averages 4 hours at night. She describes “life” as a general stressor. She reports poor appetite and averages 1 meal day. She suspects weight loss. She reports anhedonia, denies feeling of guilt or low selflessness, denies feeling of worthlessness. She reports periods of crying, denies memory or concentration changes. she reports anxiety where she feels overwhelmed, jittery, racing thought and rapid heartbeat. She reports excessive fears of everyone and everything and worries. She denies AVH, paranoia, symptoms of hannah. 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 She reports SI at times without identified plan or intent. She denies SI at present.     Patient was started on Zyprexa which was titrated to a dose of 15mg with good tolerability. Patient’s symptoms stabilized during hospitalization. At time of discharge, patient ready to go home and seek further care as an outpatient.      There were no behavioral problems on the unit.  Patient did not become agitated and did not require emergent intramuscular medications or seclusion/restraints.  Patient did not self-harm on the unit. Patient remained actively engaged in treatment. Patient participated in individual, group, and milieu therapy. Patient got along appropriately with staff and peers. Sons were contacted at time of admission to obtain collateral. Safety planning and disposition planning were performed.  Patient did not have any medical problems during this hospitalization.  There were no medical consultations.       On day of discharge, the patient has improved significantly and no longer requires inpatient treatment and care. Patient denies all suicidal and aggressive ideation, intent and plan. Patient displays no psychotic symptoms. Patient is not judged to be an acute danger to self or others at this time. She is stable for transition to outpatient level of care.         Risk Assessment: The patient is at chronic risk of harm to self and others given diagnosis of MDD with psychotic features and insomnia in addition to current psychiatric hospitalization. History     Protective factors include no access to firearms, Adult Sons, employment, stable housing, patient engagement with treatment and desire to obtain treatment (even prior to hospitalization).       On admission the patient was felt to be at an acutely elevated risk of harm to self or others as they were suffering from worsening depression, paranoia, delusions without abortive medication or pharmacotherapy established with the goal of treating/preventing them. Over the hospital course medications were started and patient was set up with after-care plans.     Although patient has an elevated chronic risk of harm to self or others, acute risk has been addressed during inpatient hospitalization. Further hospitalization is no longer warranted. Patient is ready for transition to outpatient care for further management.        Patient will be discharged with the following DSM5 diagnoses:   1.	MDD severe with psychotic features  2.	Insomnia     Patient will be discharged on the following medications:   1.	Zoloft 75mg daily - 30 days   2.	Zyprexa 15mg at bedtime - 30 days   3.	Melatonin 3mg at bedtime PRN - 30 days            1.	Will d/c home on current regimen. 2. Psychoeducation provided regarding importance of compliance with outpatient appointments and medications. 3. Pt was advised to reduce substance use (MJ/alcohol). 4. Pt was advised to dial 911, return to the ER, or visit the [facility] if they become a danger to self or others or need urgent help.       Dates of hospitalization:  01/25/24-02/13/24     On admission interview, patient presented with the following signs and symptoms:  reports worsening symptom of depression over the last week with sad and depression mood, poor sleep with difficulties falling and staying asleep, and averages 4 hours at night. She describes “life” as a general stressor. She reports poor appetite and averages 1 meal day. She suspects weight loss. She reports anhedonia, denies feeling of guilt or low selflessness, denies feeling of worthlessness. She reports periods of crying, denies memory or concentration changes. she reports anxiety where she feels overwhelmed, jittery, racing thought and rapid heartbeat. She reports excessive fears of everyone and everything and worries. She denies AVH, paranoia, symptoms of hannah. 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 She reports SI at times without identified plan or intent. She denies SI at present.     Patient was started on Zyprexa which was titrated to a dose of 15mg with good tolerability. Patient’s symptoms stabilized during hospitalization. At time of discharge, patient ready to go home and seek further care as an outpatient.      There were no behavioral problems on the unit.  Patient did not become agitated and did not require emergent intramuscular medications or seclusion/restraints.  Patient did not self-harm on the unit. Patient remained actively engaged in treatment. Patient participated in individual, group, and milieu therapy. Patient got along appropriately with staff and peers. Sons were contacted at time of admission to obtain collateral. Safety planning and disposition planning were performed.  Patient did not have any medical problems during this hospitalization.  There were no medical consultations.       On day of discharge, the patient has improved significantly and no longer requires inpatient treatment and care. Patient denies all suicidal and aggressive ideation, intent and plan. Patient displays no psychotic symptoms. Patient is not judged to be an acute danger to self or others at this time. She is stable for transition to outpatient level of care.         Risk Assessment: The patient is at chronic risk of harm to self and others given diagnosis of MDD with psychotic features and insomnia in addition to current psychiatric hospitalization. History of non compliance with medications, past hospitalizations, past suicide attempt. Financial stress.     Protective factors include no access to firearms, Adult Sons, employment, stable housing, patient engagement with treatment and desire to obtain treatment (even prior to hospitalization).       On admission the patient was felt to be at an acutely elevated risk of harm to self or others as they were suffering from worsening depression, paranoia, delusions without abortive medication or pharmacotherapy established with the goal of treating/preventing them. Over the hospital course medications were started and patient was set up with after-care plans.     Although patient has an elevated chronic risk of harm to self or others, acute risk has been addressed during inpatient hospitalization. Further hospitalization is no longer warranted. Patient is ready for transition to outpatient care for further management.        Patient will be discharged with the following DSM5 diagnoses:   1.	MDD severe with psychotic features  2.	Insomnia     Patient will be discharged on the following medications:   1.	Zoloft 75mg daily - 30 days   2.	Zyprexa 15mg at bedtime - 30 days   3.	Melatonin 3mg at bedtime PRN - 30 days            1.	Will d/c home on current regimen. 2. Psychoeducation provided regarding importance of compliance with outpatient appointments and medications. 3. Pt was advised to reduce substance use (MJ/alcohol). 4. Pt was advised to dial 911, return to the ER, or visit the [facility] if they become a danger to self or others or need urgent help.

## 2024-02-13 NOTE — BH INPATIENT PSYCHIATRY DISCHARGE NOTE - NSDCMRMEDTOKEN_GEN_ALL_CORE_FT
melatonin 3 mg oral tablet: 1 tab(s) orally once a day (at bedtime) as needed for  insomnia  OLANZapine 15 mg oral tablet: 1 tab(s) orally once a day (at bedtime)  sertraline 25 mg oral tablet: 3 tab(s) orally once a day

## 2024-02-13 NOTE — BH INPATIENT PSYCHIATRY DISCHARGE NOTE - OTHER PAST PSYCHIATRIC HISTORY (INCLUDE DETAILS REGARDING ONSET, COURSE OF ILLNESS, INPATIENT/OUTPATIENT TREATMENT)
Patient is a 58-year-old female, single, domiciled with son, has 2 adult sons, employed house cleaning and [employer] , [facility]: hx of knee surgery, dx with depression with psychosis, 1 prior admission at [facility] 2W from 5/26 to 6/6/23 for SI and depression, 2 prior SA – 1st age [age]yo by OD and was held overnight at a hospital, most recent in May 2023  where she attempted to cut her wrist and took ½ bottle of NyQuil prior to her admission. She was stabilized on Olanzapine 5mg and Sertraline 100mg daily but did not follow up with treatment after discharge ([facility]). She has been taking d/c medication sporadically and last took Olanzapine 5mg 2m ago and sertraline 100mg 2 weeks ago. She recently started treatment at [facility] for psychotherapy with plans for weekly or 2x week visits, denies substance use, denies hx of violence or aggression, no legal hx, no access to firearms, presents to [facility] seeking assessment for worsened symptoms of depression and psychosis.     She reports worsening symptom of depression over the last week with sad and depression mood, poor sleep with difficulties falling and staying asleep, and averages 4 hours at night. She describes “life” as a general stressor. She reports poor appetite and averages 1 meal day. She suspects weight loss. She reports anhedonia, denies feeling of guilt or low selflessness, denies feeling of worthlessness. She reports periods of crying, denies memory or concentration changes. she reports anxiety where she feels overwhelmed, jittery, racing thought and rapid heartbeat. She reports excessive fears of everyone and everything and worries. She denies AVH, paranoia, symptoms of magdy. 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 She reports SI at times without identified plan or intent. She denies SI at present.    Collateral from her son Forrest in ED - She gets in episodes where she says she is a lizard another time said she is a lion. Reports pt making comments that her son is not her son. He went to pick her up for a scheduled colonoscopy but she started saying she doesn’t want to be alive, none of this is real, didn’t think he was her son, saying people are going to kill and rape her. She would refer to herself as a lizard or a lion, prompting [facility] visit.  He advocated for admission.    Pt presents with symptoms of depression, anxiety, psychosis, and SI. She has not followed up with treatment since her d/c in 6/2023. She has been taking medication sporadically when feeling anxious of overwhelmed. Symptoms worsened 1 week ago without any specific trigger. She describes feeling more anxious, overwhelmed, depressed with poor sleep, and averaging 4 hours at night. Her appetite is poor with 6lb weight loss in one week. She reports poor focus and concentration, anhedonia, and low motivation. She reports paranoia that people are going to kill or rape her. She has been calling out of work all week and describes feeling disorganized and confused. She was asking if her son or her sister are going to Kill her and was noted to be looking at her son, reports she is unsure if he is her son and wonders if he is going to kill or rape her. She reports SI starting 1 week ago with thoughts “take something.” She endorses access to means including pills at home. She denies current plan or intent but unable to guarantee her safety in the community, reports “I don’t know if I can be safe.” She appears anxious and distressed. She denies HI, AVH, no hx of hannah.     She connected with Therapist at [facility] for 1 session who is connecting her to psychiatry.     While on unit, pt stated that her mood is down, says main problem is "racing thoughts" yet only had such one week ago.  Says she exercises, has no health problems, and eats healthy and agrees to work with team. Did endorse past zyprexa Rx, also with weight gain, but agrees to trial of invega, and pleased it could help with racing thoughts.

## 2024-02-13 NOTE — BH DISCHARGE NOTE NURSING/SOCIAL WORK/PSYCH REHAB - NSCDUDCCRISIS_PSY_A_CORE
[facility]  1 (593) [facility] ([phone])  Text "WELL" to 43593  Website: www.MedSocket/.[employer] [facility] 1 (088) 621-IHPT (5771) Website: www.safehorizon.org/.National Suicide Prevention Lifeline 2 (815) 318-6702/.  [employer]  1 (941) LIFENET (207-4690)/.  [facility]  [address] [place] [id number] [phone]   Hours:  Monday through Friday from 9 AM to 3 PM/988 Suicide and Crisis Lifeline

## 2024-02-13 NOTE — BH INPATIENT PSYCHIATRY DISCHARGE NOTE - NSDCCPCAREPLAN_GEN_ALL_CORE_FT
PRINCIPAL DISCHARGE DIAGNOSIS  Diagnosis: MDD (major depressive disorder), recurrent, severe, with psychosis  Assessment and Plan of Treatment:

## 2024-02-22 DIAGNOSIS — F32.9 MAJOR DEPRESSIVE DISORDER, SINGLE EPISODE, UNSPECIFIED: ICD-10-CM

## 2024-03-29 PROCEDURE — 90832 PSYTX W PT 30 MINUTES: CPT | Mod: 93

## 2024-10-04 NOTE — BH PATIENT PROFILE - BRADEN ACTIVITY
well developed, well nourished , in no acute distress , ambulating without difficulty , normal communication ability (4) walks frequently

## 2025-05-19 ENCOUNTER — APPOINTMENT (OUTPATIENT)
Dept: GASTROENTEROLOGY | Facility: CLINIC | Age: 60
End: 2025-05-19
Payer: MEDICAID

## 2025-05-19 VITALS — HEIGHT: 61 IN | BODY MASS INDEX: 36.09 KG/M2

## 2025-05-19 VITALS
OXYGEN SATURATION: 95 % | BODY MASS INDEX: 37.5 KG/M2 | HEIGHT: 60 IN | HEART RATE: 78 BPM | TEMPERATURE: 98 F | RESPIRATION RATE: 16 BRPM | SYSTOLIC BLOOD PRESSURE: 119 MMHG | DIASTOLIC BLOOD PRESSURE: 77 MMHG | WEIGHT: 191 LBS

## 2025-05-19 DIAGNOSIS — K58.2 MIXED IRRITABLE BOWEL SYNDROME: ICD-10-CM

## 2025-05-19 DIAGNOSIS — Z86.0100 PERSONAL HISTORY OF COLON POLYPS, UNSPECIFIED: ICD-10-CM

## 2025-05-19 DIAGNOSIS — Z78.9 OTHER SPECIFIED HEALTH STATUS: ICD-10-CM

## 2025-05-19 DIAGNOSIS — Z12.11 ENCOUNTER FOR SCREENING FOR MALIGNANT NEOPLASM OF COLON: ICD-10-CM

## 2025-05-19 DIAGNOSIS — K21.9 GASTRO-ESOPHAGEAL REFLUX DISEASE W/OUT ESOPHAGITIS: ICD-10-CM

## 2025-05-19 DIAGNOSIS — K58.9 IRRITABLE BOWEL SYNDROME, UNSPECIFIED: ICD-10-CM

## 2025-05-19 DIAGNOSIS — Z83.6 FAMILY HISTORY OF OTHER DISEASES OF THE RESPIRATORY SYSTEM: ICD-10-CM

## 2025-05-19 PROCEDURE — 99204 OFFICE O/P NEW MOD 45 MIN: CPT

## 2025-05-19 RX ORDER — POLYETHYLENE GLYCOL 3350, SODIUM CHLORIDE, SODIUM BICARBONATE AND POTASSIUM CHLORIDE WITH LEMON FLAVOR 420; 11.2; 5.72; 1.48 G/4L; G/4L; G/4L; G/4L
420 POWDER, FOR SOLUTION ORAL
Qty: 4000 | Refills: 0 | Status: ACTIVE | COMMUNITY
Start: 2025-05-19 | End: 1900-01-01

## 2025-05-19 RX ORDER — SERTRALINE HYDROCHLORIDE 100 MG/1
100 TABLET, FILM COATED ORAL
Refills: 0 | Status: ACTIVE | COMMUNITY

## 2025-05-19 RX ORDER — OLANZAPINE 5 MG/1
5 TABLET, FILM COATED ORAL
Refills: 0 | Status: ACTIVE | COMMUNITY

## 2025-07-17 ENCOUNTER — APPOINTMENT (OUTPATIENT)
Dept: PULMONOLOGY | Facility: CLINIC | Age: 60
End: 2025-07-17
Payer: MEDICAID

## 2025-07-17 VITALS
TEMPERATURE: 97.6 F | BODY MASS INDEX: 39.76 KG/M2 | HEART RATE: 71 BPM | DIASTOLIC BLOOD PRESSURE: 66 MMHG | WEIGHT: 284 LBS | SYSTOLIC BLOOD PRESSURE: 108 MMHG | HEIGHT: 71 IN | OXYGEN SATURATION: 98 %

## 2025-07-17 PROBLEM — R93.89 ABNORMAL CHEST CT: Status: ACTIVE | Noted: 2025-07-17

## 2025-07-17 PROBLEM — R91.8 PULMONARY NODULES: Status: ACTIVE | Noted: 2025-07-17

## 2025-07-17 PROCEDURE — 99204 OFFICE O/P NEW MOD 45 MIN: CPT | Mod: 25

## 2025-07-17 PROCEDURE — 94729 DIFFUSING CAPACITY: CPT

## 2025-07-17 PROCEDURE — 94727 GAS DIL/WSHOT DETER LNG VOL: CPT

## 2025-07-17 PROCEDURE — 94060 EVALUATION OF WHEEZING: CPT

## 2025-07-23 ENCOUNTER — APPOINTMENT (OUTPATIENT)
Dept: GASTROENTEROLOGY | Facility: AMBULATORY MEDICAL SERVICES | Age: 60
End: 2025-07-23
Payer: MEDICAID

## 2025-07-23 PROCEDURE — 45378 DIAGNOSTIC COLONOSCOPY: CPT | Mod: 33
